# Patient Record
Sex: MALE | Race: WHITE | NOT HISPANIC OR LATINO | URBAN - METROPOLITAN AREA
[De-identification: names, ages, dates, MRNs, and addresses within clinical notes are randomized per-mention and may not be internally consistent; named-entity substitution may affect disease eponyms.]

---

## 2018-01-18 ENCOUNTER — OFFICE VISIT (OUTPATIENT)
Dept: MEDICAL GROUP | Facility: MEDICAL CENTER | Age: 63
End: 2018-01-18
Payer: COMMERCIAL

## 2018-01-18 VITALS
RESPIRATION RATE: 16 BRPM | OXYGEN SATURATION: 95 % | BODY MASS INDEX: 34.5 KG/M2 | WEIGHT: 241 LBS | DIASTOLIC BLOOD PRESSURE: 88 MMHG | HEIGHT: 70 IN | SYSTOLIC BLOOD PRESSURE: 166 MMHG | HEART RATE: 66 BPM | TEMPERATURE: 96.9 F

## 2018-01-18 DIAGNOSIS — R01.1 CARDIAC MURMUR: ICD-10-CM

## 2018-01-18 DIAGNOSIS — E66.9 OBESITY (BMI 30-39.9): ICD-10-CM

## 2018-01-18 DIAGNOSIS — E78.2 MIXED HYPERLIPIDEMIA: ICD-10-CM

## 2018-01-18 DIAGNOSIS — Z87.891 FORMER HEAVY CIGARETTE SMOKER (20-39 PER DAY): ICD-10-CM

## 2018-01-18 DIAGNOSIS — Z00.00 HEALTHCARE MAINTENANCE: ICD-10-CM

## 2018-01-18 DIAGNOSIS — Z12.11 COLON CANCER SCREENING: ICD-10-CM

## 2018-01-18 DIAGNOSIS — Z82.49 FAMILY HISTORY OF HEART ATTACK: ICD-10-CM

## 2018-01-18 DIAGNOSIS — I10 ESSENTIAL HYPERTENSION: ICD-10-CM

## 2018-01-18 PROCEDURE — 99204 OFFICE O/P NEW MOD 45 MIN: CPT | Performed by: FAMILY MEDICINE

## 2018-01-18 RX ORDER — AMLODIPINE BESYLATE 10 MG/1
10 TABLET ORAL DAILY
COMMUNITY
End: 2018-01-18 | Stop reason: SDUPTHER

## 2018-01-18 RX ORDER — LISINOPRIL AND HYDROCHLOROTHIAZIDE 20; 12.5 MG/1; MG/1
1 TABLET ORAL DAILY
Qty: 90 TAB | Refills: 1 | Status: SHIPPED | OUTPATIENT
Start: 2018-01-18 | End: 2018-10-19 | Stop reason: SDUPTHER

## 2018-01-18 RX ORDER — AMLODIPINE BESYLATE 10 MG/1
10 TABLET ORAL DAILY
Qty: 90 TAB | Refills: 1 | Status: SHIPPED | OUTPATIENT
Start: 2018-01-18 | End: 2018-03-12 | Stop reason: SDUPTHER

## 2018-01-18 ASSESSMENT — PATIENT HEALTH QUESTIONNAIRE - PHQ9: CLINICAL INTERPRETATION OF PHQ2 SCORE: 0

## 2018-01-19 NOTE — PROGRESS NOTES
CC: new patient ( HTN, HLD, obesity)    HPI:  Wally presents today to establish a new PCP.    Patient has been active, and independent. With all ADLs. He still works. Has the following chronic medical issues:    Essential hypertension  BP today is elevated. However hs been asymptomatic. Denies headache, chest pain, and SOB. Patient stated that his BP is always fine as long as he takes his medication ( always less than 130/90), but he ran out of his medication fr a week.Has been on Lisinopril - HCTZ 20-12.5 mg, and amlodipine 10 mg.    Family history of heart attack  His mom  for heart attack, with no h/o cardiac issues.    Cardiac murmur  Has been having a heart murmur for loing time ( was told about it , but he has been asymptomatic.Denies h/o rheumatic fever, or rheumatic heart disease.    Mixed hyperlipidemia  His last lipid panel in  showed high t chol ( 208), and LDL ( 146), has not been on medication.No h/o DM, CAD, or stroke.    Obesity (BMI 30-39.9)  BMI is 34. Lost about 10 lbs since .Patient is counseled about the medical rationale for weight loss in obese individuals is that obesity is associated with a significant increase in mortality, and many health risks including type 2 diabetes mellitus, hypertension, dyslipidemia, and coronary heart disease. The benefits of weight loss include a reduction in the rate of progression from impaired glucose tolerance to diabetes, blood pressure in hypertensive patients, and lipid levels in higher risk patients. Other noncardiac benefits of weight loss include reductions in urinary incontinence, sleep apnea, and depression, and improvements in quality of life, physical functioning, and mobility.Recommend lifestyle modification: exercise 30 minutes per day 5 days per week. Recommend also portion control.    Former heavy cigarette smoker   Used to smoke a pack plus a day for 40 yrs, he quit 6 yrs ago. Denies any h/o copd. Currently is  asymptomatic,    Declined the flu shot.Has never had colonoscpy before.      Patient Active Problem List    Diagnosis Date Noted   • Hypertension 01/18/2010     Priority: High   • Mixed hyperlipidemia 01/18/2018   • Obesity (BMI 30-39.9) 07/09/2014   • Former heavy cigarette smoker (20-39 per day) 07/09/2014   • Family history of heart attack 07/09/2014   • Cardiac murmur 07/09/2014       Current Outpatient Prescriptions   Medication Sig Dispense Refill   • amlodipine (NORVASC) 10 MG Tab Take 10 mg by mouth every day.     • lisinopril-hydrochlorothiazide (PRINZIDE, ZESTORETIC) 20-12.5 MG per tablet Take 1 Tab by mouth every day. 30 Tab 1     No current facility-administered medications for this visit.          Allergies as of 01/18/2018 - Reviewed 01/18/2018   Allergen Reaction Noted   • Nkda [no known drug allergy]  01/18/2010        Social History     Social History   • Marital status: Single     Spouse name: N/A   • Number of children: N/A   • Years of education: N/A     Occupational History   • Not on file.     Social History Main Topics   • Smoking status: Former Smoker     Packs/day: 1.00     Years: 42.00     Types: Cigarettes     Quit date: 3/8/2013   • Smokeless tobacco: Never Used   • Alcohol use No      Comment: quit 1 1/2 ago   • Drug use: No   • Sexual activity: Not Currently     Partners: Female      Comment: , no kids, [employer] tech     Other Topics Concern   • Not on file     Social History Narrative   • No narrative on file       Family History   Problem Relation Age of Onset   • Diabetes Father    • Hypertension Father    • Heart Attack Mother    • Cancer Neg Hx    • Stroke Neg Hx        Past Surgical History:   Procedure Laterality Date   • CERVICAL LAMINECTOMY POSTERIOR  1995       ROS:  Denies any Headache, Blurred Vision, Confusion Chest pain,  Shortness of breath,  Abdominal pain, Changes of bowel or bladder, Lower ext edema, Fevers, Nights sweats, Weight Changes, Focal weakness or  "numbness.  All other systems are negative.    BP (!) 166/88   Pulse 66   Temp 36.1 °C (96.9 °F)   Resp 16   Ht 1.778 m (5' 10\")   Wt 109.3 kg (241 lb)   SpO2 95%   BMI 34.58 kg/m²     Physical Exam:  Gen:         Alert and oriented, No apparent distress.  HEENT:   Perrla, TM clear,  Oralpharynx no erythema or exudates.  Neck:       No Jugular venous distension, Lymphadenopathy, Thyromegaly, Bruits.  Lungs:     Clear to auscultation bilaterally  CV:          Regular rate and rhythm. No murmurs, rubs or gallops.  Abd:         Soft non tender, non distended. Normal active bowel sounds. No                                        Hepatosplenomegaly, No pulsatile masses.  Ext:          No clubbing, cyanosis, edema.      Assessment and Plan.   62 y.o. male     1. Essential hypertension  Elevated. He ran out of his medication.  Continue on Lisinopril_HCTZ 20-12.5 mg, and amlodipine 10 mg.Medication refilled.  Patient advised to check his BP 2 times daily for a week, and call for any concerns.    - amlodipine (NORVASC) 10 MG Tab; Take 1 Tab by mouth every day.  Dispense: 90 Tab; Refill: 1  - lisinopril-hydrochlorothiazide (PRINZIDE, ZESTORETIC) 20-12.5 MG per tablet; Take 1 Tab by mouth every day.  Dispense: 90 Tab; Refill: 1  - CBC WITH DIFFERENTIAL; Future  - COMP METABOLIC PANEL; Future  - LIPID PANEL  - TSH; Future    2. Family history of heart attack  His mom  for heart attack, with no h/o cardiac issues.    3. Cardiac murmur  Systolic murmur. Asymptomatic.  Will continue watch.    4. Healthcare maintenance  Declined the flu shot.  Has never had colonoscopy before, order placed.    5. Mixed hyperlipidemia  Last lipid panel in  showed high t chol ( 208), and LDL ( 146), has not been on medication.  Patient is counseled about life style modification.  Will continue watch his lipid panel.    - LIPID PANEL  - TSH; Future    6. Colon cancer screening    - REFERRAL TO GI FOR COLONOSCOPY    7. Obesity (BMI " 30-39.9)  BMI is 34. Lost about 10 lbs since 2014.  Patient is counseled about life style modification.    - Patient identified as having weight management issue.  Appropriate orders and counseling given.    8. Former heavy cigarette smoker (20-39 per day)  Used to smoke a pack a day for 40 yrs, he quit 6 yrs ago. Denies any h/o copd. Currently is asymptomatic,  Will discuss lung cancer screen next visit.

## 2018-01-26 ENCOUNTER — HOSPITAL ENCOUNTER (OUTPATIENT)
Dept: LAB | Facility: MEDICAL CENTER | Age: 63
End: 2018-01-26
Attending: FAMILY MEDICINE
Payer: COMMERCIAL

## 2018-01-26 DIAGNOSIS — E78.2 MIXED HYPERLIPIDEMIA: ICD-10-CM

## 2018-01-26 DIAGNOSIS — I10 ESSENTIAL HYPERTENSION: ICD-10-CM

## 2018-01-26 LAB
ALBUMIN SERPL BCP-MCNC: 4.1 G/DL (ref 3.2–4.9)
ALBUMIN/GLOB SERPL: 1.2 G/DL
ALP SERPL-CCNC: 51 U/L (ref 30–99)
ALT SERPL-CCNC: 16 U/L (ref 2–50)
ANION GAP SERPL CALC-SCNC: 7 MMOL/L (ref 0–11.9)
AST SERPL-CCNC: 17 U/L (ref 12–45)
BASOPHILS # BLD AUTO: 1.1 % (ref 0–1.8)
BASOPHILS # BLD: 0.08 K/UL (ref 0–0.12)
BILIRUB SERPL-MCNC: 0.6 MG/DL (ref 0.1–1.5)
BUN SERPL-MCNC: 14 MG/DL (ref 8–22)
CALCIUM SERPL-MCNC: 9.7 MG/DL (ref 8.5–10.5)
CHLORIDE SERPL-SCNC: 106 MMOL/L (ref 96–112)
CHOLEST SERPL-MCNC: 203 MG/DL (ref 100–199)
CO2 SERPL-SCNC: 25 MMOL/L (ref 20–33)
CREAT SERPL-MCNC: 0.85 MG/DL (ref 0.5–1.4)
EOSINOPHIL # BLD AUTO: 0.53 K/UL (ref 0–0.51)
EOSINOPHIL NFR BLD: 7.1 % (ref 0–6.9)
ERYTHROCYTE [DISTWIDTH] IN BLOOD BY AUTOMATED COUNT: 45.2 FL (ref 35.9–50)
GLOBULIN SER CALC-MCNC: 3.4 G/DL (ref 1.9–3.5)
GLUCOSE SERPL-MCNC: 110 MG/DL (ref 65–99)
HCT VFR BLD AUTO: 44.9 % (ref 42–52)
HDLC SERPL-MCNC: 62 MG/DL
HGB BLD-MCNC: 15 G/DL (ref 14–18)
IMM GRANULOCYTES # BLD AUTO: 0.02 K/UL (ref 0–0.11)
IMM GRANULOCYTES NFR BLD AUTO: 0.3 % (ref 0–0.9)
LDLC SERPL CALC-MCNC: 132 MG/DL
LYMPHOCYTES # BLD AUTO: 1.87 K/UL (ref 1–4.8)
LYMPHOCYTES NFR BLD: 24.9 % (ref 22–41)
MCH RBC QN AUTO: 30.4 PG (ref 27–33)
MCHC RBC AUTO-ENTMCNC: 33.4 G/DL (ref 33.7–35.3)
MCV RBC AUTO: 90.9 FL (ref 81.4–97.8)
MONOCYTES # BLD AUTO: 0.69 K/UL (ref 0–0.85)
MONOCYTES NFR BLD AUTO: 9.2 % (ref 0–13.4)
NEUTROPHILS # BLD AUTO: 4.31 K/UL (ref 1.82–7.42)
NEUTROPHILS NFR BLD: 57.4 % (ref 44–72)
NRBC # BLD AUTO: 0 K/UL
NRBC BLD-RTO: 0 /100 WBC
PLATELET # BLD AUTO: 387 K/UL (ref 164–446)
PMV BLD AUTO: 9.5 FL (ref 9–12.9)
POTASSIUM SERPL-SCNC: 4.1 MMOL/L (ref 3.6–5.5)
PROT SERPL-MCNC: 7.5 G/DL (ref 6–8.2)
RBC # BLD AUTO: 4.94 M/UL (ref 4.7–6.1)
SODIUM SERPL-SCNC: 138 MMOL/L (ref 135–145)
TRIGL SERPL-MCNC: 46 MG/DL (ref 0–149)
TSH SERPL DL<=0.005 MIU/L-ACNC: 0.42 UIU/ML (ref 0.38–5.33)
WBC # BLD AUTO: 7.5 K/UL (ref 4.8–10.8)

## 2018-01-26 PROCEDURE — 80053 COMPREHEN METABOLIC PANEL: CPT

## 2018-01-26 PROCEDURE — 84443 ASSAY THYROID STIM HORMONE: CPT

## 2018-01-26 PROCEDURE — 85025 COMPLETE CBC W/AUTO DIFF WBC: CPT

## 2018-01-26 PROCEDURE — 36415 COLL VENOUS BLD VENIPUNCTURE: CPT

## 2018-01-26 PROCEDURE — 80061 LIPID PANEL: CPT

## 2018-02-09 ENCOUNTER — NON-PROVIDER VISIT (OUTPATIENT)
Dept: MEDICAL GROUP | Facility: MEDICAL CENTER | Age: 63
End: 2018-02-09
Payer: COMMERCIAL

## 2018-02-09 DIAGNOSIS — Z00.00 HEALTH CARE MAINTENANCE: ICD-10-CM

## 2018-02-09 PROCEDURE — 90736 HZV VACCINE LIVE SUBQ: CPT | Performed by: FAMILY MEDICINE

## 2018-02-09 PROCEDURE — 90471 IMMUNIZATION ADMIN: CPT | Performed by: FAMILY MEDICINE

## 2018-02-09 PROCEDURE — 90715 TDAP VACCINE 7 YRS/> IM: CPT | Performed by: FAMILY MEDICINE

## 2018-02-09 PROCEDURE — 90472 IMMUNIZATION ADMIN EACH ADD: CPT | Performed by: FAMILY MEDICINE

## 2018-02-09 NOTE — PROGRESS NOTES
"Wally Horton is a 62 y.o. male here for a non-provider visit for:   DTaP  1 of 1     Reason for immunization: continue or complete series started at the office  Immunization records indicate need for vaccine: Yes, confirmed with Epic  Minimum interval has been met for this vaccine: Yes  ABN completed: Yes    Order and dose verified by: mv  VIS Dated  02/24/2015 was given to patient: Yes  All IAC Questionnaire questions were answered \"No.\"    Patient tolerated injection and no adverse effects were observed or reported: Yes    Pt scheduled for next dose in series: Not Indicated    "

## 2018-02-16 ENCOUNTER — OFFICE VISIT (OUTPATIENT)
Dept: MEDICAL GROUP | Facility: MEDICAL CENTER | Age: 63
End: 2018-02-16
Payer: COMMERCIAL

## 2018-02-16 VITALS
WEIGHT: 229 LBS | TEMPERATURE: 97.9 F | HEIGHT: 70 IN | HEART RATE: 70 BPM | DIASTOLIC BLOOD PRESSURE: 66 MMHG | OXYGEN SATURATION: 95 % | SYSTOLIC BLOOD PRESSURE: 124 MMHG | BODY MASS INDEX: 32.78 KG/M2 | RESPIRATION RATE: 16 BRPM

## 2018-02-16 DIAGNOSIS — Z00.00 HEALTHCARE MAINTENANCE: ICD-10-CM

## 2018-02-16 DIAGNOSIS — E78.2 MIXED HYPERLIPIDEMIA: ICD-10-CM

## 2018-02-16 DIAGNOSIS — I10 ESSENTIAL HYPERTENSION: ICD-10-CM

## 2018-02-16 DIAGNOSIS — R73.02 IGT (IMPAIRED GLUCOSE TOLERANCE): ICD-10-CM

## 2018-02-16 PROCEDURE — 99214 OFFICE O/P EST MOD 30 MIN: CPT | Performed by: FAMILY MEDICINE

## 2018-02-16 NOTE — PROGRESS NOTES
"CC: HTN/ HLD/High blood glucose.    HPI:   Wally presents today to discuss the lab results and the following medical issues:    Essential hypertension  BP has been adequately controlled on current medication. Denies headache, chest pain, and SOB.Has been on Amlodipine 10 mg and lisinopril-HCTZ 10-12.5 mg daily.No side effects.    Mixed hyperlipidemia  His last lipid panel showed improvement in both T chol , and LDL.( T chol was 207, nw is 203, LLDL was 146, now is 132).has been on diet control.    IGT (impaired glucose tolerance)  His most recent fasting BS was 110,has been asymptomatic. No polyuria, or polydipsia. Has been on diet control.    Declined the flu shot, he is scheduled for colonoscopy next visit.        Patient Active Problem List    Diagnosis Date Noted   • Hypertension 01/18/2010     Priority: High   • Mixed hyperlipidemia 01/18/2018   • Obesity (BMI 30-39.9) 07/09/2014   • Former heavy cigarette smoker (20-39 per day) 07/09/2014   • Family history of heart attack 07/09/2014   • Cardiac murmur 07/09/2014       Current Outpatient Prescriptions   Medication Sig Dispense Refill   • amlodipine (NORVASC) 10 MG Tab Take 1 Tab by mouth every day. 90 Tab 1   • lisinopril-hydrochlorothiazide (PRINZIDE, ZESTORETIC) 20-12.5 MG per tablet Take 1 Tab by mouth every day. 90 Tab 1     No current facility-administered medications for this visit.          Allergies as of 02/16/2018 - Reviewed 02/16/2018   Allergen Reaction Noted   • Nkda [no known drug allergy]  01/18/2010        ROS: Denies any chest pain, Shortness of breath, Changes bowel or bladder, Lower extremity edema.    Physical Exam:  /66   Pulse 70   Temp 36.6 °C (97.9 °F)   Resp 16   Ht 1.778 m (5' 10\")   Wt 103.9 kg (229 lb)   SpO2 95%   BMI 32.86 kg/m²   Gen.: Well-developed, well-nourished, no apparent distress,pleasant and cooperative with the examination  Skin:  Warm and dry with good turgor. No rashes or suspicious lesions in visible " areas  HEENT:Sinuses nontender with palpation, TMs clear, nares patent with pink mucosa and clear rhinorrhea,no septal deviation ,polyps or lesions. lips without lesions, oropharynx clear.  Neck: Trachea midline,no masses or adenopathy. No JVD.  Cor: Regular rate and rhythm without murmur, gallop or rub.  Lungs: Respirations unlabored.Clear to auscultation with equal breath sounds bilaterally. No wheezes, rhonchi.  Extremities: No cyanosis, clubbing or edema.      Assessment and Plan.   62 y.o. male     1. Essential hypertension  Has been adequately controlled on current medication. Denies headache, chest pain, and SOB.  Continue on Amlodipine 10 mg and lisinopril-HCTZ 10-12.5 mg daily.    2. Mixed hyperlipidemia  Last lipid panel showed improvement in both T chol , and LDL.( T chol was 207, nw is 203, LLDL was 146, now is 132).  Continue on diet control.    3. IGT (impaired glucose tolerance)  Most recent fasting BS was 110, patient ius counseled about life style modification.    4. Healthcare maintenance  Declined the flu shot.  Scheduled for colonoscopy next visit.

## 2018-03-12 DIAGNOSIS — I10 ESSENTIAL HYPERTENSION: ICD-10-CM

## 2018-03-12 RX ORDER — AMLODIPINE BESYLATE 10 MG/1
10 TABLET ORAL DAILY
Qty: 90 TAB | Refills: 3 | Status: SHIPPED | OUTPATIENT
Start: 2018-03-12 | End: 2018-10-19 | Stop reason: SDUPTHER

## 2018-03-12 NOTE — TELEPHONE ENCOUNTER
Was the patient seen in the last year in this department? Yes     Does patient have an active prescription for medications requested? Yes     Received Request Via: Pharmacy     Changing to mail order

## 2018-04-20 ENCOUNTER — OFFICE VISIT (OUTPATIENT)
Dept: MEDICAL GROUP | Facility: MEDICAL CENTER | Age: 63
End: 2018-04-20
Payer: COMMERCIAL

## 2018-04-20 VITALS
BODY MASS INDEX: 32.6 KG/M2 | OXYGEN SATURATION: 95 % | HEIGHT: 70 IN | SYSTOLIC BLOOD PRESSURE: 124 MMHG | TEMPERATURE: 98.2 F | HEART RATE: 64 BPM | DIASTOLIC BLOOD PRESSURE: 60 MMHG | RESPIRATION RATE: 16 BRPM | WEIGHT: 227.74 LBS

## 2018-04-20 DIAGNOSIS — I10 ESSENTIAL HYPERTENSION: ICD-10-CM

## 2018-04-20 DIAGNOSIS — Z86.010 HX OF ADENOMATOUS COLONIC POLYPS: ICD-10-CM

## 2018-04-20 DIAGNOSIS — Z87.891 FORMER HEAVY CIGARETTE SMOKER (20-39 PER DAY): ICD-10-CM

## 2018-04-20 DIAGNOSIS — E78.2 MIXED HYPERLIPIDEMIA: ICD-10-CM

## 2018-04-20 DIAGNOSIS — F17.211 CIGARETTE NICOTINE DEPENDENCE IN REMISSION: ICD-10-CM

## 2018-04-20 PROCEDURE — 99214 OFFICE O/P EST MOD 30 MIN: CPT | Performed by: FAMILY MEDICINE

## 2018-04-20 NOTE — PROGRESS NOTES
"CC:HTN, HLD, colonic polyps    HPI:   Wally presents today to discuss the following:    Essential hypertension  BP has been adequately controlled on current medication. Denies headache, chest pain, and SOB.Has been on Amlodipine 100 mg , lisinopril-HCTZ 20-12.5 mg daily, no side effects.    Mixed hyperlipidemia  His last lipid panel showed LDL was 132.No h/o DM, CAD, or stroke.Has been on diet control    He is former heavy cigarette smoker (20-39 per day)  Quit 7 yrs ago, referred to lung screening program.    Hx of adenomatous colonic polyps  Underwent a recent colonoscopy, was found to have multiple adenomatous polyps, were removed.Has been asymptomatic. Schedulled to repeat colonoscopy in 3 yrs    Patient Active Problem List    Diagnosis Date Noted   • Hypertension 01/18/2010     Priority: High   • Mixed hyperlipidemia 01/18/2018   • Obesity (BMI 30-39.9) 07/09/2014   • Former heavy cigarette smoker (20-39 per day) 07/09/2014   • Family history of heart attack 07/09/2014   • Cardiac murmur 07/09/2014       Current Outpatient Prescriptions   Medication Sig Dispense Refill   • amLODIPine (NORVASC) 10 MG Tab Take 1 Tab by mouth every day. 90 Tab 3   • lisinopril-hydrochlorothiazide (PRINZIDE, ZESTORETIC) 20-12.5 MG per tablet Take 1 Tab by mouth every day. 90 Tab 1     No current facility-administered medications for this visit.          Allergies as of 04/20/2018 - Reviewed 04/20/2018   Allergen Reaction Noted   • Nkda [no known drug allergy]  01/18/2010        ROS: Denies any chest pain, Shortness of breath, Changes bowel or bladder, Lower extremity edema.    Physical Exam:  /60   Pulse 64   Temp 36.8 °C (98.2 °F)   Resp 16   Ht 1.778 m (5' 10\")   Wt 103.3 kg (227 lb 11.8 oz)   SpO2 95%   BMI 32.68 kg/m²   Gen.: Well-developed, well-nourished, no apparent distress,pleasant and cooperative with the examination  Skin:  Warm and dry with good turgor. No rashes or suspicious lesions in visible " areas  HEENT:Sinuses nontender with palpation, TMs clear, nares patent with pink mucosa and clear rhinorrhea,no septal deviation ,polyps or lesions. lips without lesions, oropharynx clear.  Neck: Trachea midline,no masses or adenopathy. No JVD.  Cor: Regular rate and rhythm without murmur, gallop or rub.  Lungs: Respirations unlabored.Clear to auscultation with equal breath sounds bilaterally. No wheezes, rhonchi.  Extremities: No cyanosis, clubbing or edema.  Abd: Soft, NT, ND, BS+    Assessment and Plan.   62 y.o. male     1. Essential hypertension  Has been adequately controlled on current medication. Denies headache, chest pain, and SOB.  Continue on Amlodipine, lisinopril-HCTZ.    2. Mixed hyperlipidemia  Last lipid panel showed LDL was 132.  Patient is counseled about life style modification. Will recheck lipid panel in 3 month.    - REFERRAL TO LUNG CANCER SCREENING PROGRAM  - LIPID PANEL    3. Former heavy cigarette smoker (20-39 per day)/ Cigarette nicotine dependence in remission    - REFERRAL TO LUNG CANCER SCREENING PROGRAM    5. Hx of adenomatous colonic polyps  Asymptomatic.S/P colonoscopy has had multiple adenomatous polyps.  Schedulled to repeat colonoscopy in 3 yrs

## 2018-04-24 ENCOUNTER — TELEPHONE (OUTPATIENT)
Dept: HEMATOLOGY ONCOLOGY | Facility: MEDICAL CENTER | Age: 63
End: 2018-04-24

## 2018-04-24 NOTE — TELEPHONE ENCOUNTER
Received referral to lung cancer screening program.  Chart review to assess for lung cancer screening program eligibility.   1. Age 55-77 yrs of age? Yes 62 y.o.  2. 30 pack year hx of smoking, or greater? Yes 1 [id number]= 42 pkyr hx  3. Current smoker or if quit, has pt quit within last 15 yrs?Yes Quit 3/2013   4. Any signs or symptoms of lung cancer? None noted  5. Previous history of lung cancer? None noted  6. Chest CT within past 12 mos.? None noted  Patient does meet eligibility criteria. [facility] scheduling notified to schedule the shared decision making visit.

## 2018-10-19 ENCOUNTER — OFFICE VISIT (OUTPATIENT)
Dept: MEDICAL GROUP | Facility: MEDICAL CENTER | Age: 63
End: 2018-10-19
Payer: COMMERCIAL

## 2018-10-19 VITALS
RESPIRATION RATE: 16 BRPM | OXYGEN SATURATION: 89 % | BODY MASS INDEX: 34.93 KG/M2 | HEART RATE: 79 BPM | HEIGHT: 70 IN | TEMPERATURE: 98.2 F | WEIGHT: 244 LBS | DIASTOLIC BLOOD PRESSURE: 72 MMHG | SYSTOLIC BLOOD PRESSURE: 146 MMHG

## 2018-10-19 DIAGNOSIS — I10 ESSENTIAL HYPERTENSION: ICD-10-CM

## 2018-10-19 DIAGNOSIS — Z87.891 FORMER HEAVY CIGARETTE SMOKER (20-39 PER DAY): ICD-10-CM

## 2018-10-19 DIAGNOSIS — R06.02 SOB (SHORTNESS OF BREATH): ICD-10-CM

## 2018-10-19 PROCEDURE — 99214 OFFICE O/P EST MOD 30 MIN: CPT | Performed by: FAMILY MEDICINE

## 2018-10-19 RX ORDER — LISINOPRIL AND HYDROCHLOROTHIAZIDE 20; 12.5 MG/1; MG/1
1 TABLET ORAL DAILY
Qty: 90 TAB | Refills: 3 | Status: SHIPPED | OUTPATIENT
Start: 2018-10-19 | End: 2019-11-29 | Stop reason: SDUPTHER

## 2018-10-19 RX ORDER — DOXYCYCLINE HYCLATE 100 MG
100 TABLET ORAL 2 TIMES DAILY
Qty: 14 TAB | Refills: 0 | Status: SHIPPED | OUTPATIENT
Start: 2018-10-19 | End: 2018-10-26

## 2018-10-19 RX ORDER — METHYLPREDNISOLONE 4 MG/1
TABLET ORAL
Qty: 21 TAB | Refills: 0 | Status: SHIPPED | OUTPATIENT
Start: 2018-10-19

## 2018-10-19 RX ORDER — IPRATROPIUM BROMIDE AND ALBUTEROL SULFATE 2.5; .5 MG/3ML; MG/3ML
3 SOLUTION RESPIRATORY (INHALATION) ONCE
OUTPATIENT
Start: 2018-10-19 | End: 2018-10-20

## 2018-10-19 RX ORDER — ALBUTEROL SULFATE 90 UG/1
2 AEROSOL, METERED RESPIRATORY (INHALATION) EVERY 6 HOURS PRN
Qty: 8.5 G | Refills: 3 | Status: SHIPPED | OUTPATIENT
Start: 2018-10-19 | End: 2019-03-18 | Stop reason: SDUPTHER

## 2018-10-19 RX ORDER — AMLODIPINE BESYLATE 10 MG/1
10 TABLET ORAL DAILY
Qty: 90 TAB | Refills: 3 | Status: SHIPPED | OUTPATIENT
Start: 2018-10-19 | End: 2019-10-14 | Stop reason: SDUPTHER
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
## 2018-10-24 ENCOUNTER — NON-PROVIDER VISIT (OUTPATIENT)
Dept: MEDICAL GROUP | Facility: MEDICAL CENTER | Age: 63
End: 2018-10-24
Payer: COMMERCIAL

## 2018-10-24 DIAGNOSIS — Z23 FLU VACCINE NEED: ICD-10-CM

## 2018-10-24 PROCEDURE — 90471 IMMUNIZATION ADMIN: CPT | Performed by: FAMILY MEDICINE

## 2018-10-24 PROCEDURE — 90686 IIV4 VACC NO PRSV 0.5 ML IM: CPT | Performed by: FAMILY MEDICINE

## 2018-10-24 NOTE — NON-PROVIDER
"Wally Horton is a 63 y.o. male here for a non-provider visit for:   FLU    Reason for immunization: Annual Flu Vaccine  Immunization records indicate need for vaccine: Yes, confirmed with Epic  Minimum interval has been met for this vaccine: Yes  ABN completed: No    Order and dose verified by: smiley  VIS Dated  08/07/2015 was given to patient: Yes  All IAC Questionnaire questions were answered \"No.\"    Patient tolerated injection and no adverse effects were observed or reported: Yes    Pt scheduled for next dose in series: No  "

## 2018-11-13 ENCOUNTER — OFFICE VISIT (OUTPATIENT)
Dept: MEDICAL GROUP | Facility: MEDICAL CENTER | Age: 63
End: 2018-11-13
Payer: COMMERCIAL

## 2018-11-13 VITALS
WEIGHT: 249.34 LBS | OXYGEN SATURATION: 94 % | RESPIRATION RATE: 16 BRPM | HEART RATE: 91 BPM | SYSTOLIC BLOOD PRESSURE: 118 MMHG | DIASTOLIC BLOOD PRESSURE: 70 MMHG | BODY MASS INDEX: 35.7 KG/M2 | HEIGHT: 70 IN | TEMPERATURE: 97.7 F

## 2018-11-13 DIAGNOSIS — J44.9 CHRONIC OBSTRUCTIVE PULMONARY DISEASE, UNSPECIFIED COPD TYPE (HCC): ICD-10-CM

## 2018-11-13 PROCEDURE — 99213 OFFICE O/P EST LOW 20 MIN: CPT | Performed by: FAMILY MEDICINE

## 2018-11-13 RX ORDER — FLUTICASONE PROPIONATE 110 UG/1
2 AEROSOL, METERED RESPIRATORY (INHALATION) 2 TIMES DAILY
Qty: 1 INHALER | Refills: 3 | Status: SHIPPED | OUTPATIENT
Start: 2018-11-13 | End: 2019-02-28 | Stop reason: SDUPTHER

## 2018-11-14 NOTE — PROGRESS NOTES
CC: COPD    HPI:   Wally presents today to follow-up after he was treated for acute COPD exacerbation.    Patient has finished oral antibiotics, oral steroids, and has been taking albuterol rest frequent, 1-2 times a week.  Patient used to smoke about 20-40 cigarettes a day for 40 years and he quit 7 years ago.  However his wife still smokes.  Currently patient denies any cough, or shortness of breath.  Once in a while he gets short of breath early in the morning or when he inhales any kind of dust or smoke.  He has no pulmonary function test in record.        Patient Active Problem List    Diagnosis Date Noted   • Hypertension 01/18/2010     Priority: High   • Mixed hyperlipidemia 01/18/2018   • Obesity (BMI 30-39.9) 07/09/2014   • Former heavy cigarette smoker (20-39 per day) 07/09/2014   • Family history of heart attack 07/09/2014   • Cardiac murmur 07/09/2014       Current Outpatient Prescriptions   Medication Sig Dispense Refill   • fluticasone (FLOVENT HFA) 110 MCG/ACT Aerosol Inhale 2 Puffs by mouth 2 times a day. 1 Inhaler 3   • albuterol 108 (90 Base) MCG/ACT Aero Soln inhalation aerosol Inhale 2 Puffs by mouth every 6 hours as needed for Shortness of Breath. 8.5 g 3   • amLODIPine (NORVASC) 10 MG Tab Take 1 Tab by mouth every day. 90 Tab 3   • lisinopril-hydrochlorothiazide (PRINZIDE, ZESTORETIC) 20-12.5 MG per tablet Take 1 Tab by mouth every day. 90 Tab 3   • MethylPREDNISolone (MEDROL DOSEPAK) 4 MG Tablet Therapy Pack As directed on the packaging label. (Patient not taking: Reported on 11/13/2018) 21 Tab 0     No current facility-administered medications for this visit.          Allergies as of 11/13/2018 - Reviewed 11/13/2018   Allergen Reaction Noted   • Nkda [no known drug allergy]  01/18/2010        ROS: Denies any chest pain, Shortness of breath, Changes bowel or bladder, Lower extremity edema.    Physical Exam:  /70 (BP Location: Right arm, Patient Position: Sitting, BP Cuff Size: Adult)    "Pulse 91   Temp 36.5 °C (97.7 °F)   Resp 16   Ht 1.778 m (5' 10\")   Wt 113.1 kg (249 lb 5.4 oz)   SpO2 94%   BMI 35.78 kg/m²   Gen.: Well-developed, well-nourished, no apparent distress,pleasant and cooperative with the examination  Cor: Regular rate and rhythm without murmur, gallop or rub.  Lungs: Respirations unlabored.Clear to auscultation with equal breath sounds bilaterally. No wheezes, rhonchi.        Assessment and Plan.   63 y.o. male     1. Chronic obstructive pulmonary disease, unspecified COPD type (HCC)  Acute exacerbation has improved.  Continue albuterol as needed.  We will add Flovent inhaler to be taken twice a day.  Will send patient for pulmonary function test.  Advised to avoid irritations like cigarette smoking(patient was a smoker used to smoke 20-40 cigarettes a day for many years and he quit 7 years ago, however his wife smokes).    - PULMONARY FUNCTION TESTS -Test requested: Complete Pulmonary Function Test; Future  - fluticasone (FLOVENT HFA) 110 MCG/ACT Aerosol; Inhale 2 Puffs by mouth 2 times a day.  Dispense: 1 Inhaler; Refill: 3    Please note that this dictation was created using voice recognition software. I have made every reasonable attempt to correct obvious errors but there may be errors of grammar and content that I may have overlooked prior to finalization of this note.     "

## 2018-12-06 ENCOUNTER — HOSPITAL ENCOUNTER (OUTPATIENT)
Dept: PULMONOLOGY | Facility: MEDICAL CENTER | Age: 63
End: 2018-12-06
Attending: FAMILY MEDICINE
Payer: COMMERCIAL

## 2018-12-06 PROCEDURE — 94729 DIFFUSING CAPACITY: CPT

## 2018-12-06 PROCEDURE — 94060 EVALUATION OF WHEEZING: CPT | Mod: 26 | Performed by: INTERNAL MEDICINE

## 2018-12-06 PROCEDURE — 94060 EVALUATION OF WHEEZING: CPT

## 2018-12-06 PROCEDURE — 94726 PLETHYSMOGRAPHY LUNG VOLUMES: CPT | Mod: 26 | Performed by: INTERNAL MEDICINE

## 2018-12-06 PROCEDURE — 94726 PLETHYSMOGRAPHY LUNG VOLUMES: CPT

## 2018-12-06 PROCEDURE — 94729 DIFFUSING CAPACITY: CPT | Mod: 26 | Performed by: INTERNAL MEDICINE

## 2018-12-06 ASSESSMENT — PULMONARY FUNCTION TESTS
FEV1/FVC_PERCENT_PREDICTED: 117
FVC_PERCENT_PREDICTED: 52
FEV1_PERCENT_CHANGE: 2
FEV1/FVC_PERCENT_PREDICTED: 117
FVC: 2.5
FEV1/FVC_PERCENT_PREDICTED: 77
FEV1/FVC: 89
FEV1_PERCENT_PREDICTED: 63
FEV1/FVC_PERCENT_PREDICTED: 116
FVC_LLN: 3.83
FEV1_PREDICTED: 3.54
FEV1_LLN: 2.96
FEV1: 2.18
FVC: 2.42
FVC_LLN: 3.83
FEV1_PERCENT_PREDICTED: 61
FEV1/FVC: 90
FEV1/FVC_PERCENT_LLN: 64
FEV1/FVC_PERCENT_CHANGE: 0
FEV1/FVC: 90
FVC_PREDICTED: 4.59
FEV1/FVC_PERCENT_CHANGE: 67
FVC_PERCENT_PREDICTED: 54
FEV1_PERCENT_CHANGE: 3
FEV1_LLN: 2.96
FEV1/FVC: 89.2
FEV1/FVC_PREDICTED: 77
FEV1: 2.23
FEV1/FVC_PERCENT_LLN: 64
FEV1/FVC_PERCENT_PREDICTED: 116

## 2018-12-10 NOTE — PROCEDURES
PULMONARY FUNCTION TEST INTERPRETATION    REQUESTING PROVIDER:  Jaqueline Millan. MD    REASON FOR REQUEST:  COPD.    FINDINGS:  1.  Acceptable and reproducible to FEV1 2.18 liters (61%), FVC of 2.42 liters   (52%), ratio 90%.  2.  Flow volume loops consistent with restrictions.  3.  TLC 4.39 liters (62%).  4.  DLCO 23.25 mL/min/mmHg (92%).    IMPRESSION:  Spirometry consistent with restriction with markedly reduced FVC   of 52%.  No response to bronchodilator.  Restriction is moderate with total   lung capacity at 62%, and normal gas transfer.  When there is normal gas   transfer and severe restriction, may be extrathoracic cause of patient's   restrictions.  Clinically correlate.       ____________________________________     MD JASPREET Edwards / DEMETRIUS    DD:  12/09/2018 20:21:47  DT:  12/09/2018 20:38:37    D#:  [id number]  Job#:  [id number]    cc: Jaqueline Millan MD

## 2018-12-28 ENCOUNTER — PATIENT MESSAGE (OUTPATIENT)
Dept: MEDICAL GROUP | Facility: MEDICAL CENTER | Age: 63
End: 2018-12-28

## 2018-12-31 NOTE — TELEPHONE ENCOUNTER
From: Wally Horton  To: Jaqueline Millan M.D.  Sent: 12/28/2018 6:12 PM PST  Subject: Test Result Question    I can't get the test results to open up or they just don't have it in myChart file and also do I need to come in to talk to the Doctor Yana about the results and I need to schedule a appointment 90 days from the last appointment I had Thank you Wally Horton

## 2019-02-28 ENCOUNTER — OFFICE VISIT (OUTPATIENT)
Dept: MEDICAL GROUP | Facility: MEDICAL CENTER | Age: 64
End: 2019-02-28
Payer: COMMERCIAL

## 2019-02-28 VITALS
OXYGEN SATURATION: 100 % | WEIGHT: 250 LBS | RESPIRATION RATE: 16 BRPM | HEIGHT: 70 IN | SYSTOLIC BLOOD PRESSURE: 148 MMHG | HEART RATE: 82 BPM | TEMPERATURE: 98.5 F | BODY MASS INDEX: 35.79 KG/M2 | DIASTOLIC BLOOD PRESSURE: 80 MMHG

## 2019-02-28 DIAGNOSIS — J44.1 COPD EXACERBATION (HCC): ICD-10-CM

## 2019-02-28 DIAGNOSIS — I10 ESSENTIAL HYPERTENSION: ICD-10-CM

## 2019-02-28 DIAGNOSIS — J44.9 CHRONIC OBSTRUCTIVE PULMONARY DISEASE, UNSPECIFIED COPD TYPE (HCC): ICD-10-CM

## 2019-02-28 PROCEDURE — 99214 OFFICE O/P EST MOD 30 MIN: CPT | Performed by: FAMILY MEDICINE

## 2019-02-28 RX ORDER — IPRATROPIUM BROMIDE AND ALBUTEROL SULFATE 2.5; .5 MG/3ML; MG/3ML
3 SOLUTION RESPIRATORY (INHALATION) ONCE
Status: COMPLETED | OUTPATIENT
Start: 2019-02-28 | End: 2019-02-28

## 2019-02-28 RX ORDER — FLUTICASONE PROPIONATE 110 UG/1
2 AEROSOL, METERED RESPIRATORY (INHALATION) 2 TIMES DAILY
Qty: 1 INHALER | Refills: 3 | Status: SHIPPED | OUTPATIENT
Start: 2019-02-28 | End: 2019-03-13 | Stop reason: SDUPTHER

## 2019-02-28 RX ORDER — FLUTICASONE PROPIONATE 110 UG/1
2 AEROSOL, METERED RESPIRATORY (INHALATION) 2 TIMES DAILY
Qty: 1 INHALER | Refills: 3 | Status: SHIPPED | OUTPATIENT
Start: 2019-02-28 | End: 2019-03-11 | Stop reason: SDUPTHER

## 2019-02-28 RX ORDER — METHYLPREDNISOLONE 4 MG/1
TABLET ORAL
Qty: 21 TAB | Refills: 0 | Status: SHIPPED | OUTPATIENT
Start: 2019-02-28 | End: 2019-08-02 | Stop reason: SDUPTHER

## 2019-02-28 RX ADMIN — IPRATROPIUM BROMIDE AND ALBUTEROL SULFATE 3 ML: 2.5; .5 SOLUTION RESPIRATORY (INHALATION) at 17:58

## 2019-03-01 NOTE — PROGRESS NOTES
CC: Cough and shortness of breath, hypertension    HPI:   Wally presents today to discuss the following medical issues:    COPD exacerbation (HCC)  Patient stated that has been feeling that his chest is tight for the past week however it has worsened gradually on for the past 3 days has been having dry cough, shortness of breath, Associated with clear phlegm.  Denies any chest pain, fever, or chills.  Patient stated that he has been taking Ventolin inhaler as needed, but since he ran out of the Flovent 2 weeks ago has been taking it more frequent about once or twice a day.  Patient has had pulmonary function test showed restrictive lung disease.  But with the symptoms patient has, chronic recurrent wheeze, history of heavy smoking, patient is is clinically COPD.  Consider referral to pulmonologist.    Essential hypertension  Blood pressure today is slightly high for his age.  Denies headache, chest pain, shortness of breath.  Patient stated that she has been on lisinopril-hydrochlorothiazide 20-12.5 mg daily, and amlodipine 10 mg daily, has been taking his medication in a regular basis and his blood pressure is always within normal limit less than (140/90.)    Patient Active Problem List    Diagnosis Date Noted   • Hypertension 01/18/2010     Priority: High   • Chronic obstructive pulmonary disease (HCC) 02/28/2019   • Mixed hyperlipidemia 01/18/2018   • Obesity (BMI 30-39.9) 07/09/2014   • Former heavy cigarette smoker (20-39 per day) 07/09/2014   • Family history of heart attack 07/09/2014   • Cardiac murmur 07/09/2014       Current Outpatient Prescriptions   Medication Sig Dispense Refill   • MethylPREDNISolone (MEDROL DOSEPAK) 4 MG Tablet Therapy Pack As directed on the packaging label. 21 Tab 0   • fluticasone (FLOVENT HFA) 110 MCG/ACT Aerosol Inhale 2 Puffs by mouth 2 times a day. 1 Inhaler 3   • fluticasone (FLOVENT HFA) 110 MCG/ACT Aerosol Inhale 2 Puffs by mouth 2 times a day. 1 Inhaler 3   • albuterol 108  "(90 Base) MCG/ACT Aero Soln inhalation aerosol Inhale 2 Puffs by mouth every 6 hours as needed for Shortness of Breath. 8.5 g 3   • MethylPREDNISolone (MEDROL DOSEPAK) 4 MG Tablet Therapy Pack As directed on the packaging label. (Patient not taking: Reported on 11/13/2018) 21 Tab 0   • amLODIPine (NORVASC) 10 MG Tab Take 1 Tab by mouth every day. 90 Tab 3   • lisinopril-hydrochlorothiazide (PRINZIDE, ZESTORETIC) 20-12.5 MG per tablet Take 1 Tab by mouth every day. 90 Tab 3     Current Facility-Administered Medications   Medication Dose Route Frequency Provider Last Rate Last Dose   • ipratropium-albuterol (DUONEB) nebulizer solution  3 mL Nebulization Once Jaqueline Millan M.D.             Allergies as of 02/28/2019 - Reviewed 02/28/2019   Allergen Reaction Noted   • Nkda [no known drug allergy]  01/18/2010        ROS: Denies any chest pain, Shortness of breath, Changes bowel or bladder, Lower extremity edema.    Physical Exam:  /80 (BP Location: Right arm, Patient Position: Sitting, BP Cuff Size: Adult long)   Pulse 82   Temp 36.9 °C (98.5 °F) (Temporal)   Resp 16   Ht 1.778 m (5' 10\")   Wt 113.4 kg (250 lb)   SpO2 100%   BMI 35.87 kg/m²   Gen.: Well-developed, well-nourished, no apparent distress,pleasant and cooperative with the examination  Skin:  Warm and dry with good turgor. No rashes or suspicious lesions in visible areas  HEENT:Sinuses nontender with palpation, TMs clear, nares patent with pink mucosa and clear rhinorrhea,no septal deviation ,polyps or lesions. lips without lesions, oropharynx clear.  Neck: Trachea midline,no masses or adenopathy. No JVD.  Cor: Regular rate and rhythm without murmur, gallop or rub.  Lungs: Respirations unlabored.decrease breath sounds bilaterally.  Diffuse expiratory wheezes, no crackles  Extremities: No cyanosis, clubbing or edema.      Assessment and Plan.   63 y.o. male     1. COPD exacerbation (HCC)  Duoneb nebulizer treatment given in the clinic " today.  Will start patient on oral steroids.  Advised to take albuterol inhaler every 6 hours in a regular basis for 3 days, then every 6 hours as needed after that.  We will refill Flovent inhaler, patient advised to call for a refill about 1 or 2 a week before he ra=un out of the medication.  Patient has been having clear phlegm so, probably no need for antibiotics.  Patient advised to return to the clinic if the condition gets worse.  Consider adding Spiriva in the future.  Patient has had pulmonary function test showed restrictive lung disease.  But with the symptoms patient has, chronic recurrent wheeze, history of heavy smoking, patient is is clinically COPD.  Will refer patient to pulmonologist for more evaluation.    - MethylPREDNISolone (MEDROL DOSEPAK) 4 MG Tablet Therapy Pack; As directed on the packaging label.  Dispense: 21 Tab; Refill: 0  - ipratropium-albuterol (DUONEB) nebulizer solution; 3 mL by Nebulization route Once.  - fluticasone (FLOVENT HFA) 110 MCG/ACT Aerosol; Inhale 2 Puffs by mouth 2 times a day.  Dispense: 1 Inhaler; Refill: 3    2. Essential hypertension  Slightly elevated for his age.  Probably because of the respiratory distress.  Will now continue on current medication(lisinopril-hydrochlorothiazide 20-12.5 mg daily, and amlodipine 10 mg daily)

## 2019-03-11 DIAGNOSIS — J44.9 CHRONIC OBSTRUCTIVE PULMONARY DISEASE, UNSPECIFIED COPD TYPE (HCC): ICD-10-CM

## 2019-03-11 RX ORDER — FLUTICASONE PROPIONATE 110 UG/1
2 AEROSOL, METERED RESPIRATORY (INHALATION) 2 TIMES DAILY
Qty: 1 INHALER | Refills: 3 | Status: SHIPPED | OUTPATIENT
Start: 2019-03-11

## 2019-03-13 DIAGNOSIS — J44.1 COPD EXACERBATION (HCC): ICD-10-CM

## 2019-03-13 RX ORDER — FLUTICASONE PROPIONATE 110 UG/1
2 AEROSOL, METERED RESPIRATORY (INHALATION) 2 TIMES DAILY
Qty: 1 INHALER | Refills: 3 | Status: SHIPPED | OUTPATIENT
Start: 2019-03-13 | End: 2020-04-27 | Stop reason: SDUPTHER

## 2019-03-18 DIAGNOSIS — R06.02 SOB (SHORTNESS OF BREATH): ICD-10-CM

## 2019-03-18 RX ORDER — ALBUTEROL SULFATE 90 UG/1
2 AEROSOL, METERED RESPIRATORY (INHALATION) EVERY 6 HOURS PRN
Qty: 8.5 G | Refills: 3 | Status: SHIPPED | OUTPATIENT
Start: 2019-03-18 | End: 2020-04-27 | Stop reason: SDUPTHER

## 2019-04-04 ASSESSMENT — ENCOUNTER SYMPTOMS
RESPIRATORY SYMPTOMS COMMENTS: NO
CHEST TIGHTNESS: 1
SHORTNESS OF BREATH: 1
HEMOPTYSIS: 0
WHEEZING: 1
DYSPNEA AT REST: 1

## 2019-04-05 ENCOUNTER — OFFICE VISIT (OUTPATIENT)
Dept: PULMONOLOGY | Facility: HOSPICE | Age: 64
End: 2019-04-05
Payer: COMMERCIAL

## 2019-04-05 VITALS
WEIGHT: 250 LBS | RESPIRATION RATE: 14 BRPM | OXYGEN SATURATION: 94 % | BODY MASS INDEX: 35.79 KG/M2 | HEIGHT: 70 IN | SYSTOLIC BLOOD PRESSURE: 146 MMHG | HEART RATE: 71 BPM | DIASTOLIC BLOOD PRESSURE: 90 MMHG

## 2019-04-05 DIAGNOSIS — R06.02 SOB (SHORTNESS OF BREATH): ICD-10-CM

## 2019-04-05 DIAGNOSIS — E66.9 OBESITY (BMI 30-39.9): ICD-10-CM

## 2019-04-05 DIAGNOSIS — J98.4 RESTRICTIVE LUNG DISEASE: ICD-10-CM

## 2019-04-05 PROCEDURE — 99205 OFFICE O/P NEW HI 60 MIN: CPT | Performed by: INTERNAL MEDICINE

## 2019-04-05 NOTE — PROGRESS NOTES
CC:  Chief Complaint   Patient presents with   • New Patient   • COPD     Dyspnea on exertion referred to us for evaluation for possible COPD    HPI:   The patient is a 63 y.o. male with a history of dyspnea on exertion and history of intermittent wheezing was referred to our clinic from his primary care physician to rule out COPD.  The patient has significant smoking history however he quit few years ago.    The patient did pulmonary function tests prior to his visit which showed restrictive pattern not consistent with COPD.  Total lung capacity was 62% of predicted.    The patient has orthopnea and paroxysmal nocturnal dyspnea.  He has also lower extremity swelling.  Echocardiogram done in 2012 was unremarkable.  We do not have any chest x-ray in our system to evaluate his lung parenchyma.    ROS:   Constitutional: Denies fevers, chills, night sweats  Eyes: Denies vision loss, pain, drainage, double vision  Ears, Nose, Throat: Denies earache, difficulty hearing, tinnitus, nasal congestion, hoarseness  Cardiovascular: Denies chest pain, tightness, palpitations, orthopnea or edema  Respiratory: Please see HPI  GI: Denies heartburn, dysphagia, nausea, abdominal pain, diarrhea or constipation  : Denies frequent urination, hematuria, discharge or painful urination  Musculoskeletal: Denies back pain, painful joints, sore muscles  Neurological: Denies weakness or headaches  Skin: No rashes  All other ROS were negative except what mentioned in the HPI     Past Medical History:  Past Medical History:   Diagnosis Date   • Family history of heart attack 7/9/2014   • Former heavy cigarette smoker (20-39 per day) 7/9/2014   • Hypertension 1/18/2010   • Mixed hyperlipidemia 1/18/2018   • Obesity (BMI 30-39.9) 7/9/2014               Social History:  Social History     Social History   • Marital status: Single     Spouse name: N/A   • Number of children: N/A   • Years of education: N/A     Occupational History   • Not on file.  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 8.5 g 3   • amLODIPine (NORVASC) 10 MG Tab Take 1 Tab by mouth every day. 90 Tab 3   • lisinopril-hydrochlorothiazide (PRINZIDE, ZESTORETIC) 20-12.5 MG per tablet Take 1 Tab by mouth every day. 90 Tab 3   • fluticasone (FLOVENT HFA) 110 MCG/ACT Aerosol Inhale 2 Puffs by mouth 2 times a day. 1 Inhaler 3   • fluticasone (FLOVENT HFA) 110 MCG/ACT Aerosol Inhale 2 Puffs by mouth 2 times a day. 1 Inhaler 3   • MethylPREDNISolone (MEDROL DOSEPAK) 4 MG Tablet Therapy Pack As directed on the packaging label. 21 Tab 0   • MethylPREDNISolone (MEDROL DOSEPAK) 4 MG Tablet Therapy Pack As directed on the packaging label. (Patient not taking: Reported on 11/13/2018) 21 Tab 0     No current facility-administered medications on file prior to visit.        Allergies:   Nkda [no known drug allergy]        Vitals:    04/05/19 1044   Height: 1.778 m (5' 10\")   Weight: 113.4 kg (250 lb)   Weight % change since last entry.: 0 %   BP: 146/90   Pulse: 71   BMI (Calculated): 35.87   Resp: 14           Physical Exam:  Appearance:  in no acute distress  HEENT: Normocephalic, atraumatic, white sclera, " PERRLA, oropharynx clear  Neck: No adenopathy or masses  Respiratory: no intercostal retractions or accessory muscle use  Lungs auscultation: Clear to auscultation bilaterally  Cardiovascular: Regular rate rhythm. No murmurs, rubs or gallops.  No LE edema  Abdomen: soft, nondistended  Gait: Normal  Digits: No clubbing, cyanosis  Motor: No focal deficits  Orientation: Oriented to time, person and place      DATA:    Labs:  Lab Results   Component Value Date/Time    WBC 7.5 01/26/2018 08:59 AM    WBC 8.7 01/20/2010 04:00 PM    RBC 4.94 01/26/2018 08:59 AM    RBC 4.86 01/20/2010 04:00 PM    HEMOGLOBIN 15.0 01/26/2018 08:59 AM    HEMATOCRIT 44.9 01/26/2018 08:59 AM    MCV 90.9 01/26/2018 08:59 AM    MCV 96 01/20/2010 04:00 PM    MCH 30.4 01/26/2018 08:59 AM    MCH 32.1 01/20/2010 04:00 PM    MCHC 33.4 (L) 01/26/2018 08:59 AM    MPV 9.5 01/26/2018 08:59 AM    NEUTSPOLYS 57.40 01/26/2018 08:59 AM    LYMPHOCYTES 24.90 01/26/2018 08:59 AM    MONOCYTES 9.20 01/26/2018 08:59 AM    EOSINOPHILS 7.10 (H) 01/26/2018 08:59 AM    BASOPHILS 1.10 01/26/2018 08:59 AM      Lab Results   Component Value Date/Time    SODIUM 138 01/26/2018 08:59 AM    POTASSIUM 4.1 01/26/2018 08:59 AM    CHLORIDE 106 01/26/2018 08:59 AM    CO2 25 01/26/2018 08:59 AM    GLUCOSE 110 (H) 01/26/2018 08:59 AM    BUN 14 01/26/2018 08:59 AM    CREATININE 0.85 01/26/2018 08:59 AM    CREATININE 0.79 01/20/2010 04:00 PM    BUNCREATRAT 18 01/20/2010 04:00 PM    GLOMRATE >59 01/20/2010 04:00 PM                  Diagnosis:  1. SOB (shortness of breath)  EC-ECHOCARDIOGRAM COMPLETE W/O CONT    BTYPE NATRIURETIC PEPTIDE    CT-CHEST, HIGH RESOLUTION LUNG   2. Restrictive lung disease  CT-CHEST, HIGH RESOLUTION LUNG   3. Obesity (BMI 30-39.9)  OBESITY COUNSELING (No Charge): Patient identified as having weight management issue.  Appropriate orders and counseling given.        Assessment and Plan     Patient shortness of breath is of unclear etiology at this point.  He has  history of heavy smoking in the past but quit a few years ago however pulmonary function test is more consistent with restriction.  His lung exam was unremarkable with no inspiratory crackles to suggest pulmonary fibrosis.  The patient symptoms are somehow suggestive of cardiac disease with orthopnea and paroxysmal nocturnal dyspnea.  The patient gets lower extremity swelling at times.    I am going to check echocardiogram.  Also we will check BMP prior to next visit.  Because the patient has restriction on pulmonary function tests and decreased diffusion capacity we will do high-resolution CT scan to rule out interstitial lung disease.        Patient currently is not active smoking              Return in about 4 weeks (around 5/3/2019) for wht ct chest .        This note was created using voice recognition software. I apologize for any overlooked obvious grammar or  vocabulary mistake    Answers for HPI/ROS submitted by the patient on 4/4/2019   What is the reason for your visit today?: To make sure I'm using the right meds  Do you cough first thing in the morning or at other times during the day?: sometimes  Do you have a cough on most days? If so, how long have you had this cough?: not really  Bring up phlegm (mucus, sputum) in the morning or other times during the day?: no   Do you cough up blood from your chest?: No  Do you experience wheezing?: Yes  How often?: intermittently  Do you experience any chest tightness?: Yes  How often?: constant  Experience shortness of breath?: Yes  Experience shortness of breath at rest?: Yes  How far can you walk before becoming short of breath or need to rest? : not far  Please list what causes you to become short of breath:: putting on my shoes  Have you ever been hospitalized?: Yes  Have you ever needed to be intubated or placed on a ventilator? : No  Have you ever had problems with anesthesia?: No  Have you experienced post-operative delirium?: No  Any complications with  surgery?: No  Have you had a TB skin test? If so, please list the year and result:: 2018  Please list all your occupations from your first job to your current job. Include Industry/Company, location, year, and your specific job.: [employer]   with As[employer]: in the [employer]  Please list the places you have lived, the year, and Country or State in the [place]  Birds?: No  Dogs?: Yes  Cats?: Yes  Mice and/or Deer Mice?: No  Reptiles?: No  Coffee: 2  Carbonated soft drinks: 1    Conflicting answers have been found for some questions. Please document the patient's answers manually.

## 2019-04-08 ENCOUNTER — HOSPITAL ENCOUNTER (OUTPATIENT)
Dept: LAB | Facility: MEDICAL CENTER | Age: 64
End: 2019-04-08
Attending: INTERNAL MEDICINE
Payer: COMMERCIAL

## 2019-04-08 DIAGNOSIS — R06.02 SOB (SHORTNESS OF BREATH): ICD-10-CM

## 2019-04-08 LAB — BNP SERPL-MCNC: 19 PG/ML (ref 0–100)

## 2019-04-08 PROCEDURE — 83880 ASSAY OF NATRIURETIC PEPTIDE: CPT

## 2019-04-08 PROCEDURE — 36415 COLL VENOUS BLD VENIPUNCTURE: CPT

## 2019-04-09 ENCOUNTER — TELEPHONE (OUTPATIENT)
Dept: PULMONOLOGY | Facility: HOSPICE | Age: 64
End: 2019-04-09

## 2019-04-09 NOTE — TELEPHONE ENCOUNTER
Patient called states that he has scheduled a CT with [employer] and he has paper order for CT. Patient states that [employer] called [facility] to verify if patient is a Pulmonary patient. Informed patient that maybe Pulmonary notes with  were yet not in the system and that is why they couldn't confirm to [employer] that he is a new Pulmonary Patient.     Patient was last seen on 04/05/19 with .     Assessment and Plan      Patient shortness of breath is of unclear etiology at this point.  He has history of heavy smoking in the past but quit a few years ago however pulmonary function test is more consistent with restriction.  His lung exam was unremarkable with no inspiratory crackles to suggest pulmonary fibrosis.  The patient symptoms are somehow suggestive of cardiac disease with orthopnea and paroxysmal nocturnal dyspnea.  The patient gets lower extremity swelling at times.     I am going to check echocardiogram.  Also we will check BMP prior to next visit.  Because the patient has restriction on pulmonary function tests and decreased diffusion capacity we will do high-resolution CT scan to rule out interstitial lung disease.           Patient currently is not active smoking                    Return in about 4 weeks (around 5/3/2019) for wht ct chest .

## 2019-04-10 ENCOUNTER — TELEPHONE (OUTPATIENT)
Dept: PULMONOLOGY | Facility: HOSPICE | Age: 64
End: 2019-04-10

## 2019-04-10 NOTE — TELEPHONE ENCOUNTER
Pt called and said that he will be getting his CT at [facility] on April 16th.

## 2019-04-23 ENCOUNTER — HOSPITAL ENCOUNTER (OUTPATIENT)
Dept: CARDIOLOGY | Facility: MEDICAL CENTER | Age: 64
End: 2019-04-23
Attending: INTERNAL MEDICINE
Payer: COMMERCIAL

## 2019-04-23 DIAGNOSIS — R06.02 SOB (SHORTNESS OF BREATH): ICD-10-CM

## 2019-04-23 LAB
LV EJECT FRACT  99904: 65
LV EJECT FRACT MOD 2C 99903: 55.24
LV EJECT FRACT MOD 4C 99902: 83.95
LV EJECT FRACT MOD BP 99901: 72.2

## 2019-04-23 PROCEDURE — 93306 TTE W/DOPPLER COMPLETE: CPT

## 2019-04-23 PROCEDURE — 93306 TTE W/DOPPLER COMPLETE: CPT | Mod: 26 | Performed by: INTERNAL MEDICINE

## 2019-05-01 ENCOUNTER — OFFICE VISIT (OUTPATIENT)
Dept: PULMONOLOGY | Facility: HOSPICE | Age: 64
End: 2019-05-01
Payer: COMMERCIAL

## 2019-05-01 VITALS
RESPIRATION RATE: 16 BRPM | DIASTOLIC BLOOD PRESSURE: 86 MMHG | OXYGEN SATURATION: 94 % | WEIGHT: 250 LBS | HEIGHT: 70 IN | SYSTOLIC BLOOD PRESSURE: 150 MMHG | BODY MASS INDEX: 35.79 KG/M2 | HEART RATE: 84 BPM

## 2019-05-01 DIAGNOSIS — G47.33 OSA (OBSTRUCTIVE SLEEP APNEA): ICD-10-CM

## 2019-05-01 DIAGNOSIS — E66.9 OBESITY (BMI 30-39.9): ICD-10-CM

## 2019-05-01 PROCEDURE — 99214 OFFICE O/P EST MOD 30 MIN: CPT | Performed by: INTERNAL MEDICINE

## 2019-05-02 ENCOUNTER — OFFICE VISIT (OUTPATIENT)
Dept: MEDICAL GROUP | Facility: MEDICAL CENTER | Age: 64
End: 2019-05-02
Payer: COMMERCIAL

## 2019-05-02 VITALS
BODY MASS INDEX: 35.95 KG/M2 | WEIGHT: 251.1 LBS | OXYGEN SATURATION: 94 % | TEMPERATURE: 98.7 F | SYSTOLIC BLOOD PRESSURE: 152 MMHG | DIASTOLIC BLOOD PRESSURE: 72 MMHG | HEIGHT: 70 IN | HEART RATE: 80 BPM

## 2019-05-02 DIAGNOSIS — I10 ESSENTIAL HYPERTENSION: ICD-10-CM

## 2019-05-02 DIAGNOSIS — M72.2 PLANTAR FASCIITIS: ICD-10-CM

## 2019-05-02 DIAGNOSIS — J44.9 CHRONIC OBSTRUCTIVE PULMONARY DISEASE, UNSPECIFIED COPD TYPE (HCC): ICD-10-CM

## 2019-05-02 PROCEDURE — 99214 OFFICE O/P EST MOD 30 MIN: CPT | Performed by: FAMILY MEDICINE

## 2019-05-02 NOTE — PROGRESS NOTES
CC:  Chief Complaint   Patient presents with   • Follow-Up   • Results     ct echo and lab     Patient came today for follow-up appointment.  He has restrictive lung disease on pulmonary function test.    HPI:   The patient is a 63 y.o. male.  With history of dyspnea on exertion and wheezing and history of heavy smoking quit in 2013 came today for follow-up.    I saw the patient few weeks ago.  Pulmonary function test at that time showed moderate restrictive lung disease with total lung capacity of 62% of predicted.  The patient was having orthopnea and paroxysmal nocturnal dyspnea.  We did echocardiogram since last visit which was unremarkable.  His BNP was also normal.  High-resolution CT scan to rule out interstitial lung disease was also unremarkable.    The patient's symptoms of wheezing and shortness of breath on exertion has significantly improved since I saw him last time.  He is currently taking Flovent twice daily and albuterol on as-needed basis.      ROS:   Constitutional: Denies fevers, chills, night sweats  Eyes: Denies vision loss, pain, drainage, double vision  Ears, Nose, Throat: Denies earache, difficulty hearing, tinnitus, nasal congestion, hoarseness  Cardiovascular: Denies chest pain, tightness, palpitations, orthopnea or edema  Respiratory: Please see HPI  Sleep: The patient has heavy snoring.  Sometimes he wakes up tired.  No witnessed sleep apnea's.  GI: Denies heartburn, dysphagia, nausea, abdominal pain, diarrhea or constipation  : Denies frequent urination, hematuria, discharge or painful urination  Musculoskeletal: Denies back pain, painful joints, sore muscles  Neurological: Denies weakness or headaches  Skin: No rashes  All other ROS were negative except what mentioned in the HPI     Past Medical History:  Past Medical History:   Diagnosis Date   • Family history of heart attack 7/9/2014   • Former heavy cigarette smoker (20-39 per day) 7/9/2014   • Hypertension 1/18/2010   • Mixed  hyperlipidemia 1/18/2018   • Obesity (BMI 30-39.9) 7/9/2014               Social History:  Social History     Social History   • Marital status: Single     Spouse name: N/A   • Number of children: N/A   • Years of education: N/A     Occupational History   • Not on file.     Social History Main Topics   • Smoking status: Former Smoker     Packs/day: 1.00     Years: 42.00     Types: Cigarettes     Quit date: 3/8/2013   • Smokeless tobacco: Never Used   • Alcohol use No      Comment: quit 1 1/2 ago   • Drug use: No   • Sexual activity: Not Currently     Partners: Female      Comment: , no kids, [employer]     Other Topics Concern   • Not on file     Social History Narrative   • No narrative on file         Family History:  Family History   Problem Relation Age of Onset   • Diabetes Father    • Hypertension Father    • Heart Attack Mother    • Cancer Neg Hx    • Stroke Neg Hx        Current Outpatient Prescriptions on File Prior to Visit   Medication Sig Dispense Refill   • amLODIPine (NORVASC) 10 MG Tab Take 1 Tab by mouth every day. 90 Tab 3   • lisinopril-hydrochlorothiazide (PRINZIDE, ZESTORETIC) 20-12.5 MG per tablet Take 1 Tab by mouth every day. 90 Tab 3   • albuterol 108 (90 Base) MCG/ACT Aero Soln inhalation aerosol Inhale 2 Puffs by mouth every 6 hours as needed for Shortness of Breath. 8.5 g 3   • fluticasone (FLOVENT HFA) 110 MCG/ACT Aerosol Inhale 2 Puffs by mouth 2 times a day. 1 Inhaler 3   • fluticasone (FLOVENT HFA) 110 MCG/ACT Aerosol Inhale 2 Puffs by mouth 2 times a day. 1 Inhaler 3   • MethylPREDNISolone (MEDROL DOSEPAK) 4 MG Tablet Therapy Pack As directed on the packaging label. (Patient not taking: Reported on 5/1/2019) 21 Tab 0   • MethylPREDNISolone (MEDROL DOSEPAK) 4 MG Tablet Therapy Pack As directed on the packaging label. (Patient not taking: Reported on 11/13/2018) 21 Tab 0     No current facility-administered medications on file prior to visit.        Allergies:   Nkda [no known  "drug allergy]        Vitals:    05/01/19 1347   Height: 1.778 m (5' 10\")   Weight: 113.4 kg (250 lb)   Weight % change since last entry.: 0 %   BP: 150/86   Pulse: 84   BMI (Calculated): 35.87   Resp: 16           Physical Exam:  Appearance:  in no acute distress  HEENT: Normocephalic, atraumatic, white sclera, PERRLA, oropharynx clear  Neck: No adenopathy or masses  Respiratory: no intercostal retractions or accessory muscle use  Lungs auscultation: Clear to auscultation bilaterally  Cardiovascular: Regular rate rhythm. No murmurs, rubs or gallops.  No LE edema  Abdomen: soft, nondistended  Gait: Normal  Digits: No clubbing, cyanosis  Motor: No focal deficits  Orientation: Oriented to time, person and place      DATA:    Labs:  Lab Results   Component Value Date/Time    WBC 7.5 01/26/2018 08:59 AM    WBC 8.7 01/20/2010 04:00 PM    RBC 4.94 01/26/2018 08:59 AM    RBC 4.86 01/20/2010 04:00 PM    HEMOGLOBIN 15.0 01/26/2018 08:59 AM    HEMATOCRIT 44.9 01/26/2018 08:59 AM    MCV 90.9 01/26/2018 08:59 AM    MCV 96 01/20/2010 04:00 PM    MCH 30.4 01/26/2018 08:59 AM    MCH 32.1 01/20/2010 04:00 PM    MCHC 33.4 (L) 01/26/2018 08:59 AM    MPV 9.5 01/26/2018 08:59 AM    NEUTSPOLYS 57.40 01/26/2018 08:59 AM    LYMPHOCYTES 24.90 01/26/2018 08:59 AM    MONOCYTES 9.20 01/26/2018 08:59 AM    EOSINOPHILS 7.10 (H) 01/26/2018 08:59 AM    BASOPHILS 1.10 01/26/2018 08:59 AM      Lab Results   Component Value Date/Time    SODIUM 138 01/26/2018 08:59 AM    POTASSIUM 4.1 01/26/2018 08:59 AM    CHLORIDE 106 01/26/2018 08:59 AM    CO2 25 01/26/2018 08:59 AM    GLUCOSE 110 (H) 01/26/2018 08:59 AM    BUN 14 01/26/2018 08:59 AM    CREATININE 0.85 01/26/2018 08:59 AM    CREATININE 0.79 01/20/2010 04:00 PM    BUNCREATRAT 18 01/20/2010 04:00 PM    GLOMRATE >59 01/20/2010 04:00 PM        Imaging:  Please see HPI  ECHOCARDIOGRAM:  Please see HPI    PULMONARY FUNCTION TEST:  Please see HPI        Diagnosis:  1. Obesity (BMI 30-39.9)  Overnight " Oximetry        The patient restrictive lung disease is probably secondary to obesity and body habitus.  As listed BMI is 35.8.  Counseling on weight loss and healthy diet and active lifestyle was provided today.  Work-up for cardiac and pulmonary disease is negative so far.    The patient also did not have COPD and the pulmonary function test however his wheezing and dyspnea on exertion and exertion improved with inhaled steroids.  He did not have any more episodes of wheezing for months now.  He is asking if he can stop the Flovent since it is expensive.  We will try a trial without inhaled steroids.  The patient will resume taking them if symptoms return.    Given the patient's obesity and heavy snoring and daytime fatigue I suggested to do polysomnography however the patient does not want to do the study at this point because he is worried about the cost.  We decided to check overnight oximetry and if it is positive for hypoxemia we will order polysomnography and sleep clinic appointments.    I will call the patient that if the results of the overnight oximetry.  He will see us from now on on as-needed basis.                        Return if symptoms worsen or fail to improve.        This note was created using voice recognition software. I apologize for any overlooked obvious grammar or  vocabulary mistake

## 2019-05-03 ENCOUNTER — HOME STUDY (OUTPATIENT)
Dept: PULMONOLOGY | Facility: HOSPICE | Age: 64
End: 2019-05-03
Attending: INTERNAL MEDICINE
Payer: COMMERCIAL

## 2019-05-03 DIAGNOSIS — E66.9 OBESITY (BMI 30-39.9): ICD-10-CM

## 2019-05-03 PROCEDURE — 94762 N-INVAS EAR/PLS OXIMTRY CONT: CPT | Performed by: INTERNAL MEDICINE

## 2019-05-03 NOTE — PROGRESS NOTES
"CC: COPD, hypertension, plantar fasciitis    HPI:   Wally presents today discuss the following medical issues:    Chronic obstructive pulmonary disease, unspecified COPD type (HCC)  Patient breathing has improved a lot.  Currently he denies any cough or shortness of breath.  Has been on Flovent twice a day, and albuterol as needed.  Patient has been using the albuterol once a day especially in the morning, but he stated that all his wheeze resolved, however he feels a little bit short of breath in the morning but without any wheezing or cough..    Essential hypertension  Blood pressure has been adequately controlled.  Denies headache, chest pain, shortness of breath.  Patient has been on amlodipine 10 mg daily, and lisinopril-hydrochlorothiazide 20-12.5 mg daily.  No side effects.    Plantar fasciitis  Patient has been having pain in the bottom of his feet for 2 weeks.  However it has worsened in the past week.  Patient works [employer], walking and standing \"the whole night.  He works 10 hours 4 times a week.  The pain is more when he start walking from prolonged sitting, or when he wake up from his sleep and start walking.    Patient Active Problem List    Diagnosis Date Noted   • Hypertension 01/18/2010     Priority: High   • Restrictive lung disease 04/05/2019   • SOB (shortness of breath) 04/05/2019   • Chronic obstructive pulmonary disease (HCC) 02/28/2019   • Mixed hyperlipidemia 01/18/2018   • Obesity (BMI 30-39.9) 07/09/2014   • Former heavy cigarette smoker (20-39 per day) 07/09/2014   • Family history of heart attack 07/09/2014   • Cardiac murmur 07/09/2014       Current Outpatient Prescriptions   Medication Sig Dispense Refill   • amLODIPine (NORVASC) 10 MG Tab Take 1 Tab by mouth every day. 90 Tab 3   • lisinopril-hydrochlorothiazide (PRINZIDE, ZESTORETIC) 20-12.5 MG per tablet Take 1 Tab by mouth every day. 90 Tab 3   • albuterol 108 (90 Base) MCG/ACT Aero Soln inhalation aerosol Inhale 2 Puffs by mouth " "every 6 hours as needed for Shortness of Breath. 8.5 g 3   • fluticasone (FLOVENT HFA) 110 MCG/ACT Aerosol Inhale 2 Puffs by mouth 2 times a day. (Patient not taking: Reported on 5/2/2019) 1 Inhaler 3   • fluticasone (FLOVENT HFA) 110 MCG/ACT Aerosol Inhale 2 Puffs by mouth 2 times a day. (Patient not taking: Reported on 5/2/2019) 1 Inhaler 3   • MethylPREDNISolone (MEDROL DOSEPAK) 4 MG Tablet Therapy Pack As directed on the packaging label. (Patient not taking: Reported on 5/1/2019) 21 Tab 0   • MethylPREDNISolone (MEDROL DOSEPAK) 4 MG Tablet Therapy Pack As directed on the packaging label. (Patient not taking: Reported on 11/13/2018) 21 Tab 0     No current facility-administered medications for this visit.          Allergies as of 05/02/2019 - Reviewed 05/02/2019   Allergen Reaction Noted   • Nkda [no known drug allergy]  01/18/2010        ROS: Denies any chest pain, Shortness of breath, Changes bowel or bladder, Lower extremity edema.    Physical Exam:  /72 (BP Location: Left arm, Patient Position: Sitting, BP Cuff Size: Adult)   Pulse 80   Temp 37.1 °C (98.7 °F) (Temporal)   Ht 1.778 m (5' 10\")   Wt 113.9 kg (251 lb 1.6 oz)   SpO2 94%   BMI 36.03 kg/m²   Gen.: Well-developed, well-nourished, no apparent distress,pleasant and cooperative with the examination  Skin:  Warm and dry with good turgor. No rashes or suspicious lesions in visible areas  HEENT:Sinuses nontender with palpation, TMs clear, nares patent with pink mucosa and clear rhinorrhea,no septal deviation ,polyps or lesions. lips without lesions, oropharynx clear.  Neck: Trachea midline,no masses or adenopathy. No JVD.  Cor: Regular rate and rhythm without murmur, gallop or rub.  Lungs: Respirations unlabored.Clear to auscultation with equal breath sounds bilaterally. No wheezes, rhonchi.  Extremities: No cyanosis, clubbing or edema.  Bilateral tenderness of both heels, right more than the left.        Assessment and Plan.   63 y.o. male "     1. Chronic obstructive pulmonary disease, unspecified COPD type (HCC)  Stable.  Continue on Flovent twice a day, and albuterol as needed.    2. Essential hypertension  Adequate control.  Continue on amlodipine 10 mg daily, and lisinopril-hydrochlorothiazide 20-12.5 mg daily.    3. Plantar fasciitis  New problem.    Try short-term trial (two to three weeks) of nonsteroidal antiinflammatory drugs (NSAIDs) with food,(patient has normal kidney functions.  Recommend supportive treatment as follows:  - Performing of stretching exercises for the plantar fascia and calf muscles, which the patient can do at home  - Avoiding the use of flat shoes and barefoot walking  - Using prefabricated, over-the-counter, silicone heel shoe inserts (arch supports and/or heel cups)  - Decreasing physical activities that may be causative or aggravating (eg, excessive running, dancing, or jumping)

## 2019-05-06 NOTE — PROCEDURES
Overnight oximetry was performed on May 3, 2019.  On room air patient had a duration of 7 hours and 54 minutes.  Oxygen desaturation was marked, below 90% almost the entire time with marked clustering suggesting sleep disordered breathing.  Underlying pulmonary disorder could be contributory as well.  Average low saturation was 83%.

## 2019-05-09 ENCOUNTER — PATIENT MESSAGE (OUTPATIENT)
Dept: MEDICAL GROUP | Facility: MEDICAL CENTER | Age: 64
End: 2019-05-09

## 2019-05-10 NOTE — TELEPHONE ENCOUNTER
From: Wally Horton  To: Jaqueline iMllan M.D.  Sent: 5/9/2019 5:10 PM PDT  Subject: Prescription Question    Hello Doc here are my results for my blood pressure for the last week   5/4 5:00 pm 148 / 82 5/5 8:00am 150 /84 5/6 2:30pm 136 / 83   10:00 pm 149 /80 6:30pm 136 /78 6:30pm 143 /76    5/7 1:00am 168 /97 5/8 1:00am 140 /84 5/9 1:00am 162 /79   2:30 pm 148 /84 5:00pm 136 /78 2:30pm 130 / 70   The ones at 1:00am are when I take my pills before work

## 2019-05-14 ENCOUNTER — TELEPHONE (OUTPATIENT)
Dept: SLEEP MEDICINE | Facility: MEDICAL CENTER | Age: 64
End: 2019-05-14

## 2019-05-14 PROBLEM — G47.33 OSA (OBSTRUCTIVE SLEEP APNEA): Status: ACTIVE | Noted: 2019-05-14

## 2019-05-14 NOTE — TELEPHONE ENCOUNTER
I called the patient and informed him of his overnight pulse oximetry study results which showed severe nocturnal hypoxemia.  I recommended to the patient to have polysomnography and follow-up with the sleep clinic.  He was agreeable to that recommendation.    I ordered polysomnography and referral to the sleep clinic.

## 2019-05-19 ENCOUNTER — PATIENT MESSAGE (OUTPATIENT)
Dept: MEDICAL GROUP | Facility: MEDICAL CENTER | Age: 64
End: 2019-05-19

## 2019-05-20 NOTE — TELEPHONE ENCOUNTER
From: Wally Horton  To: Jaqueline Millan M.D.  Sent: 5/19/2019 9:56 AM PDT  Subject: Prescription Question    Hello Skyler here are my results for the last week 5/12 2:00pm 166 / 86 71, 5/13 1:00am 152 / 97 80, 1:30pm 132 / 77 77, 5/14 1:00am 160/95 76, 4:30pm 145/80 80, 5/15 1:00am 153/90 73, 1:30pm 153/86 71, 5/16 1:00am 151/83 72, 2:00pm 154/84 68, 5/17 8:30am 148/86 75, 5:30pm 128/72 75, 5/18 8:30am 147/89 77, 7:00pm 139/85 78, 5/19 7:30am 164/92 77 I am almost out of Amlodipine Have enough for a couple of days is all

## 2019-05-24 DIAGNOSIS — I10 ESSENTIAL HYPERTENSION: ICD-10-CM

## 2019-05-27 RX ORDER — AMLODIPINE BESYLATE 10 MG/1
TABLET ORAL
Qty: 90 TAB | Refills: 1 | Status: SHIPPED | OUTPATIENT
Start: 2019-05-27 | End: 2019-10-12 | Stop reason: SDUPTHER

## 2019-08-02 ENCOUNTER — OFFICE VISIT (OUTPATIENT)
Dept: MEDICAL GROUP | Facility: MEDICAL CENTER | Age: 64
End: 2019-08-02
Payer: COMMERCIAL

## 2019-08-02 VITALS
WEIGHT: 257.2 LBS | BODY MASS INDEX: 36.82 KG/M2 | HEART RATE: 82 BPM | SYSTOLIC BLOOD PRESSURE: 142 MMHG | HEIGHT: 70 IN | OXYGEN SATURATION: 91 % | DIASTOLIC BLOOD PRESSURE: 82 MMHG | TEMPERATURE: 97.9 F

## 2019-08-02 DIAGNOSIS — I10 ESSENTIAL HYPERTENSION: ICD-10-CM

## 2019-08-02 DIAGNOSIS — E78.2 MIXED HYPERLIPIDEMIA: ICD-10-CM

## 2019-08-02 DIAGNOSIS — J44.1 COPD EXACERBATION (HCC): ICD-10-CM

## 2019-08-02 PROCEDURE — 99214 OFFICE O/P EST MOD 30 MIN: CPT | Performed by: FAMILY MEDICINE

## 2019-08-02 RX ORDER — DOXYCYCLINE HYCLATE 100 MG
100 TABLET ORAL 2 TIMES DAILY
Qty: 14 TAB | Refills: 0 | Status: SHIPPED | OUTPATIENT
Start: 2019-08-02 | End: 2019-08-09

## 2019-08-02 RX ORDER — METHYLPREDNISOLONE 4 MG/1
TABLET ORAL
Qty: 21 TAB | Refills: 0 | Status: SHIPPED | OUTPATIENT
Start: 2019-08-02 | End: 2019-10-28 | Stop reason: SDUPTHER

## 2019-08-02 RX ORDER — IPRATROPIUM BROMIDE AND ALBUTEROL SULFATE 2.5; .5 MG/3ML; MG/3ML
3 SOLUTION RESPIRATORY (INHALATION) ONCE
OUTPATIENT
Start: 2019-08-02 | End: 2019-08-03

## 2019-08-02 NOTE — PROGRESS NOTES
CC: Cough/shortness of breath, hypertension, hyperlipidemia    HPI:   Wally presents today discuss the followin. COPD exacerbation (HCC)  Patient has been having productive cough with yellow phlegm for the past 3 days, has been associated with shortness of breath.  Patient has history of COPD.  He stated that he has not been taking Flovent in a regular basis.  He started taking the albuterol only now when he gets symptomatic about 3-4 times a day.  Denies any fever, chills, or chest pain.  Patient advised to take Flovent twice a day regularly.  His oxygen saturation today is normal.    2. Essential hypertension  Blood pressure has been adequately controlled.  Denies headache, chest pain, shortness of breath.  He has been on lisinopril-hydrochlorothiazide 20 mg- 12.5 mg he takes full tablet in the morning and half a tablet in the evening , and amlodipine 10 mg daily.    3. Mixed hyperlipidemia  Last lipid panel was checked in 2018, showed high total cholesterol and LDL.  10 years cardiac risk is 12.4%.  However patient has no history of diabetes, stroke or CAD.      Patient Active Problem List    Diagnosis Date Noted   • SHAAN (obstructive sleep apnea) 2019   • Restrictive lung disease 2019   • SOB (shortness of breath) 2019   • Chronic obstructive pulmonary disease (HCC) 2019   • Mixed hyperlipidemia 2018   • Obesity (BMI 30-39.9) 2014   • Former heavy cigarette smoker (20-39 per day) 2014   • Family history of heart attack 2014   • Cardiac murmur 2014   • Hypertension 2010       Current Outpatient Medications   Medication Sig Dispense Refill   • methylPREDNISolone (MEDROL DOSEPAK) 4 MG Tablet Therapy Pack As directed on the packaging label. 21 Tab 0   • doxycycline (VIBRAMYCIN) 100 MG Tab Take 1 Tab by mouth 2 times a day for 7 days. 14 Tab 0   • amLODIPine (NORVASC) 10 MG Tab Take 1 Tab by mouth every day. 90 Tab 3   •  "lisinopril-hydrochlorothiazide (PRINZIDE, ZESTORETIC) 20-12.5 MG per tablet Take 1 Tab by mouth every day. 90 Tab 3   • amLODIPine (NORVASC) 10 MG Tab TAKE 1 TABLET BY MOUTH  EVERY DAY (Patient not taking: Reported on 8/2/2019) 90 Tab 1   • albuterol 108 (90 Base) MCG/ACT Aero Soln inhalation aerosol Inhale 2 Puffs by mouth every 6 hours as needed for Shortness of Breath. (Patient not taking: Reported on 8/2/2019) 8.5 g 3   • fluticasone (FLOVENT HFA) 110 MCG/ACT Aerosol Inhale 2 Puffs by mouth 2 times a day. (Patient not taking: Reported on 5/2/2019) 1 Inhaler 3   • fluticasone (FLOVENT HFA) 110 MCG/ACT Aerosol Inhale 2 Puffs by mouth 2 times a day. (Patient not taking: Reported on 5/2/2019) 1 Inhaler 3   • MethylPREDNISolone (MEDROL DOSEPAK) 4 MG Tablet Therapy Pack As directed on the packaging label. (Patient not taking: Reported on 11/13/2018) 21 Tab 0     No current facility-administered medications for this visit.          Allergies as of 08/02/2019 - Reviewed 05/02/2019   Allergen Reaction Noted   • Nkda [no known drug allergy]  01/18/2010        ROS: Denies any chest pain, Shortness of breath, Changes bowel or bladder, Lower extremity edema.    Physical Exam:  /82   Pulse 82   Temp 36.6 °C (97.9 °F) (Temporal)   Ht 1.778 m (5' 10\")   Wt 116.7 kg (257 lb 3.2 oz)   SpO2 91%   BMI 36.90 kg/m²   Gen.: Well-developed, well-nourished, no apparent distress,pleasant and cooperative with the examination  Skin:  Warm and dry with good turgor. No rashes or suspicious lesions in visible areas  HEENT:Sinuses nontender with palpation, TMs clear, nares patent with pink mucosa and clear rhinorrhea,no septal deviation ,polyps or lesions. lips without lesions, oropharynx clear.  Neck: Trachea midline,no masses or adenopathy. No JVD.  Cor: Regular rate and rhythm without murmur, gallop or rub.  Lungs: Respirations unlabored.Decreased breath sounds bilaterally.  Diffuse wheezes(has improved after treatment), no " crackles  Extremities: No cyanosis, clubbing or edema.        Assessment and Plan.   64 y.o. male     1. COPD exacerbation (HCC)  Patient has not been taking Flovent in a regular basis.  He started taking the albuterol only now when he gets symptomatic.  We will give the patient DuoNeb treatment in the clinic today.  Patient advised to take Flovent twice a day regularly.  Will start oral antibiotics and steroids.  Recommend supportive treatment as follows:    - ipratropium-albuterol (DUONEB) nebulizer solution  - methylPREDNISolone (MEDROL DOSEPAK) 4 MG Tablet Therapy Pack; As directed on the packaging label.  Dispense: 21 Tab; Refill: 0  - doxycycline (VIBRAMYCIN) 100 MG Tab; Take 1 Tab by mouth 2 times a day for 7 days.  Dispense: 14 Tab; Refill: 0    2. Essential hypertension  Adequately controlled.    Continue on lisinopril-hydrochlorothiazide 20 mg- 12.5 mg daily, and amlodipine 10 mg daily.    3. Mixed hyperlipidemia  Last lipid panel was checked in January 2018, showed high total cholesterol and LDL.  10 years cardiac risk is 12.4%.  Will repeat lipid panel.  Will discuss options of treatment next visit.  Patient is counseled about lifestyle modification.    - LIPID PANEL

## 2019-09-20 ENCOUNTER — HOSPITAL ENCOUNTER (OUTPATIENT)
Dept: LAB | Facility: MEDICAL CENTER | Age: 64
End: 2019-09-20
Attending: FAMILY MEDICINE
Payer: COMMERCIAL

## 2019-09-20 DIAGNOSIS — I10 ESSENTIAL HYPERTENSION: ICD-10-CM

## 2019-09-20 LAB
ALBUMIN SERPL BCP-MCNC: 4.5 G/DL (ref 3.2–4.9)
ALBUMIN/GLOB SERPL: 1.5 G/DL
ALP SERPL-CCNC: 50 U/L (ref 30–99)
ALT SERPL-CCNC: 19 U/L (ref 2–50)
ANION GAP SERPL CALC-SCNC: 7 MMOL/L (ref 0–11.9)
AST SERPL-CCNC: 21 U/L (ref 12–45)
BASOPHILS # BLD AUTO: 1.3 % (ref 0–1.8)
BASOPHILS # BLD: 0.11 K/UL (ref 0–0.12)
BILIRUB SERPL-MCNC: 0.5 MG/DL (ref 0.1–1.5)
BUN SERPL-MCNC: 17 MG/DL (ref 8–22)
CALCIUM SERPL-MCNC: 9.3 MG/DL (ref 8.5–10.5)
CHLORIDE SERPL-SCNC: 104 MMOL/L (ref 96–112)
CHOLEST SERPL-MCNC: 215 MG/DL (ref 100–199)
CO2 SERPL-SCNC: 26 MMOL/L (ref 20–33)
CREAT SERPL-MCNC: 0.92 MG/DL (ref 0.5–1.4)
EOSINOPHIL # BLD AUTO: 0.6 K/UL (ref 0–0.51)
EOSINOPHIL NFR BLD: 6.9 % (ref 0–6.9)
ERYTHROCYTE [DISTWIDTH] IN BLOOD BY AUTOMATED COUNT: 48.3 FL (ref 35.9–50)
FASTING STATUS PATIENT QL REPORTED: NORMAL
GLOBULIN SER CALC-MCNC: 3 G/DL (ref 1.9–3.5)
GLUCOSE SERPL-MCNC: 98 MG/DL (ref 65–99)
HCT VFR BLD AUTO: 46.8 % (ref 42–52)
HDLC SERPL-MCNC: 54 MG/DL
HGB BLD-MCNC: 14.7 G/DL (ref 14–18)
IMM GRANULOCYTES # BLD AUTO: 0.02 K/UL (ref 0–0.11)
IMM GRANULOCYTES NFR BLD AUTO: 0.2 % (ref 0–0.9)
LDLC SERPL CALC-MCNC: 133 MG/DL
LYMPHOCYTES # BLD AUTO: 2.24 K/UL (ref 1–4.8)
LYMPHOCYTES NFR BLD: 25.8 % (ref 22–41)
MCH RBC QN AUTO: 29.8 PG (ref 27–33)
MCHC RBC AUTO-ENTMCNC: 31.4 G/DL (ref 33.7–35.3)
MCV RBC AUTO: 94.7 FL (ref 81.4–97.8)
MONOCYTES # BLD AUTO: 0.79 K/UL (ref 0–0.85)
MONOCYTES NFR BLD AUTO: 9.1 % (ref 0–13.4)
NEUTROPHILS # BLD AUTO: 4.92 K/UL (ref 1.82–7.42)
NEUTROPHILS NFR BLD: 56.7 % (ref 44–72)
NRBC # BLD AUTO: 0 K/UL
NRBC BLD-RTO: 0 /100 WBC
PLATELET # BLD AUTO: 360 K/UL (ref 164–446)
PMV BLD AUTO: 9.8 FL (ref 9–12.9)
POTASSIUM SERPL-SCNC: 4.6 MMOL/L (ref 3.6–5.5)
PROT SERPL-MCNC: 7.5 G/DL (ref 6–8.2)
RBC # BLD AUTO: 4.94 M/UL (ref 4.7–6.1)
SODIUM SERPL-SCNC: 137 MMOL/L (ref 135–145)
TRIGL SERPL-MCNC: 139 MG/DL (ref 0–149)
TSH SERPL DL<=0.005 MIU/L-ACNC: 0.55 UIU/ML (ref 0.38–5.33)
WBC # BLD AUTO: 8.7 K/UL (ref 4.8–10.8)

## 2019-09-20 PROCEDURE — 84443 ASSAY THYROID STIM HORMONE: CPT

## 2019-09-20 PROCEDURE — 36415 COLL VENOUS BLD VENIPUNCTURE: CPT

## 2019-09-20 PROCEDURE — 85025 COMPLETE CBC W/AUTO DIFF WBC: CPT

## 2019-09-20 PROCEDURE — 80061 LIPID PANEL: CPT

## 2019-09-20 PROCEDURE — 80053 COMPREHEN METABOLIC PANEL: CPT

## 2019-10-12 DIAGNOSIS — I10 ESSENTIAL HYPERTENSION: ICD-10-CM

## 2019-10-14 RX ORDER — AMLODIPINE BESYLATE 10 MG/1
TABLET ORAL
Qty: 90 TAB | Refills: 3 | Status: SHIPPED | OUTPATIENT
Start: 2019-10-14

## 2019-10-14 RX ORDER — AMLODIPINE BESYLATE 10 MG/1
10 TABLET ORAL DAILY
Qty: 90 TAB | Refills: 3 | Status: SHIPPED | OUTPATIENT
Start: 2019-10-14

## 2019-10-28 DIAGNOSIS — J44.1 COPD EXACERBATION (HCC): ICD-10-CM

## 2019-10-28 RX ORDER — METHYLPREDNISOLONE 4 MG/1
TABLET ORAL
Qty: 21 TAB | Refills: 0 | Status: SHIPPED | OUTPATIENT
Start: 2019-10-28 | End: 2020-10-02 | Stop reason: SDUPTHER

## 2019-10-31 ENCOUNTER — OFFICE VISIT (OUTPATIENT)
Dept: MEDICAL GROUP | Facility: MEDICAL CENTER | Age: 64
End: 2019-10-31
Payer: COMMERCIAL

## 2019-10-31 VITALS
DIASTOLIC BLOOD PRESSURE: 80 MMHG | TEMPERATURE: 98.3 F | HEIGHT: 70 IN | BODY MASS INDEX: 37.65 KG/M2 | HEART RATE: 76 BPM | SYSTOLIC BLOOD PRESSURE: 134 MMHG | RESPIRATION RATE: 16 BRPM | OXYGEN SATURATION: 93 % | WEIGHT: 263 LBS

## 2019-10-31 DIAGNOSIS — G47.33 OSA (OBSTRUCTIVE SLEEP APNEA): ICD-10-CM

## 2019-10-31 DIAGNOSIS — J44.9 CHRONIC OBSTRUCTIVE PULMONARY DISEASE, UNSPECIFIED COPD TYPE (HCC): ICD-10-CM

## 2019-10-31 DIAGNOSIS — E78.2 MIXED HYPERLIPIDEMIA: ICD-10-CM

## 2019-10-31 DIAGNOSIS — I10 ESSENTIAL HYPERTENSION: ICD-10-CM

## 2019-10-31 PROCEDURE — 99214 OFFICE O/P EST MOD 30 MIN: CPT | Performed by: FAMILY MEDICINE

## 2019-10-31 RX ORDER — METHYLPREDNISOLONE 4 MG/1
TABLET ORAL
Qty: 21 TAB | Refills: 0 | Status: SHIPPED | OUTPATIENT
Start: 2019-10-31

## 2019-10-31 ASSESSMENT — PATIENT HEALTH QUESTIONNAIRE - PHQ9: CLINICAL INTERPRETATION OF PHQ2 SCORE: 0

## 2019-11-29 DIAGNOSIS — I10 ESSENTIAL HYPERTENSION: ICD-10-CM

## 2019-12-02 RX ORDER — LISINOPRIL AND HYDROCHLOROTHIAZIDE 20; 12.5 MG/1; MG/1
1 TABLET ORAL DAILY
Qty: 90 TAB | Refills: 3 | Status: SHIPPED | OUTPATIENT
Start: 2019-12-02 | End: 2020-07-25 | Stop reason: SDUPTHER

## 2020-04-27 DIAGNOSIS — J44.1 COPD EXACERBATION (HCC): ICD-10-CM

## 2020-04-27 DIAGNOSIS — R06.02 SOB (SHORTNESS OF BREATH): ICD-10-CM

## 2020-04-27 RX ORDER — ALBUTEROL SULFATE 90 UG/1
2 AEROSOL, METERED RESPIRATORY (INHALATION) EVERY 6 HOURS PRN
Qty: 8.5 G | Refills: 3 | Status: SHIPPED | OUTPATIENT
Start: 2020-04-27 | End: 2020-07-13 | Stop reason: SDUPTHER

## 2020-04-27 RX ORDER — FLUTICASONE PROPIONATE 110 UG/1
2 AEROSOL, METERED RESPIRATORY (INHALATION) 2 TIMES DAILY
Qty: 1 INHALER | Refills: 3 | Status: SHIPPED | OUTPATIENT
Start: 2020-04-27

## 2020-05-15 ENCOUNTER — PATIENT MESSAGE (OUTPATIENT)
Dept: MEDICAL GROUP | Facility: MEDICAL CENTER | Age: 65
End: 2020-05-15

## 2020-05-18 ENCOUNTER — PATIENT MESSAGE (OUTPATIENT)
Dept: MEDICAL GROUP | Facility: MEDICAL CENTER | Age: 65
End: 2020-05-18

## 2020-05-18 NOTE — TELEPHONE ENCOUNTER
From: Wally Horton  To: Jaqueline Millan M.D.  Sent: 5/18/2020 10:57 AM PDT  Subject: Procedure Question    I am unemployed and have no Ins how much money do I need to see you      ----- Message -----   From:Jaqueline Millan M.D.   Sent:5/18/2020 9:16 AM PDT   To:Wally Horton   Subject:RE: Procedure Question    I need to see you to examine you and discuss option of treatment.      ----- Message -----   From:Wally Horton   Sent:5/15/2020 12:52 PM PDT   To:Jaqueline Millan M.D.   Subject:Procedure Question    Hello I just tried to take a shower I ran out of breathe had a panic attack My COPD is getting real bad can't do much without running out of breathe cant even walk 20 feet was wondering if I could get a stronger inhaler I could only afford the one Albutarol sulfate Thank You Wally Horton

## 2020-07-13 DIAGNOSIS — R06.02 SOB (SHORTNESS OF BREATH): ICD-10-CM

## 2020-07-13 RX ORDER — ALBUTEROL SULFATE 90 UG/1
2 AEROSOL, METERED RESPIRATORY (INHALATION) EVERY 6 HOURS PRN
Qty: 8.5 G | Refills: 3 | Status: SHIPPED | OUTPATIENT
Start: 2020-07-13 | End: 2020-09-24 | Stop reason: SDUPTHER

## 2020-07-25 DIAGNOSIS — I10 ESSENTIAL HYPERTENSION: ICD-10-CM

## 2020-07-27 RX ORDER — LISINOPRIL AND HYDROCHLOROTHIAZIDE 20; 12.5 MG/1; MG/1
1 TABLET ORAL DAILY
Qty: 90 TAB | Refills: 3 | Status: SHIPPED | OUTPATIENT
Start: 2020-07-27

## 2020-09-24 DIAGNOSIS — R06.02 SOB (SHORTNESS OF BREATH): ICD-10-CM

## 2020-09-25 RX ORDER — ALBUTEROL SULFATE 90 UG/1
2 AEROSOL, METERED RESPIRATORY (INHALATION) EVERY 6 HOURS PRN
Qty: 8.5 G | Refills: 3 | Status: SHIPPED | OUTPATIENT
Start: 2020-09-25

## 2020-10-02 DIAGNOSIS — J44.1 COPD EXACERBATION (HCC): ICD-10-CM

## 2020-10-05 RX ORDER — METHYLPREDNISOLONE 4 MG/1
TABLET ORAL
Qty: 21 TAB | Refills: 0 | Status: SHIPPED | OUTPATIENT
Start: 2020-10-05 | End: 2020-12-08

## 2020-12-07 DIAGNOSIS — J44.1 COPD EXACERBATION (HCC): ICD-10-CM

## 2020-12-08 ENCOUNTER — HOSPITAL ENCOUNTER (OUTPATIENT)
Facility: MEDICAL CENTER | Age: 65
End: 2020-12-08
Attending: PHYSICIAN ASSISTANT
Payer: MEDICAID

## 2020-12-08 PROCEDURE — U0003 INFECTIOUS AGENT DETECTION BY NUCLEIC ACID (DNA OR RNA); SEVERE ACUTE RESPIRATORY SYNDROME CORONAVIRUS 2 (SARS-COV-2) (CORONAVIRUS DISEASE [COVID-19]), AMPLIFIED PROBE TECHNIQUE, MAKING USE OF HIGH THROUGHPUT TECHNOLOGIES AS DESCRIBED BY CMS-2020-01-R: HCPCS

## 2020-12-08 RX ORDER — METHYLPREDNISOLONE 4 MG/1
TABLET ORAL
Qty: 21 TAB | Refills: 0 | Status: SHIPPED | OUTPATIENT
Start: 2020-12-08

## 2020-12-09 LAB
COVID ORDER STATUS COVID19: NORMAL
SARS-COV-2 RNA RESP QL NAA+PROBE: NOTDETECTED
SPECIMEN SOURCE: NORMAL

## 2020-12-11 ENCOUNTER — HOSPITAL ENCOUNTER (OUTPATIENT)
Dept: RADIOLOGY | Facility: MEDICAL CENTER | Age: 65
End: 2020-12-11
Attending: PHYSICIAN ASSISTANT
Payer: MEDICAID

## 2020-12-11 DIAGNOSIS — J44.9 CHRONIC OBSTRUCTIVE PULMONARY DISEASE, UNSPECIFIED COPD TYPE (HCC): ICD-10-CM

## 2020-12-11 PROCEDURE — 71046 X-RAY EXAM CHEST 2 VIEWS: CPT

## 2021-02-12 ENCOUNTER — TELEPHONE (OUTPATIENT)
Dept: MEDICAL GROUP | Facility: MEDICAL CENTER | Age: 66
End: 2021-02-12

## 2021-02-12 NOTE — TELEPHONE ENCOUNTER
Received request via: Patient    Was the patient seen in the last year in this department? Yes    Does the patient have an active prescription (recently filled or refills available) for medication(s) requested? No     Patient is requesting a refill of Omega-3 Acid Ethyl Esters 1 g to be taken by mouth 4 times daily that is not currently on his medication list.

## 2021-02-27 ENCOUNTER — PATIENT OUTREACH (OUTPATIENT)
Dept: HEALTH INFORMATION MANAGEMENT | Facility: OTHER | Age: 66
End: 2021-02-27

## 2021-03-01 ENCOUNTER — TELEPHONE (OUTPATIENT)
Dept: MEDICAL GROUP | Facility: MEDICAL CENTER | Age: 66
End: 2021-03-01

## 2021-03-01 DIAGNOSIS — E78.2 MIXED HYPERLIPIDEMIA: ICD-10-CM

## 2021-08-09 DIAGNOSIS — Z00.6 RESEARCH STUDY PATIENT: ICD-10-CM

## 2021-08-10 ENCOUNTER — HOSPITAL ENCOUNTER (OUTPATIENT)
Facility: MEDICAL CENTER | Age: 66
End: 2021-08-10
Attending: PATHOLOGY
Payer: COMMERCIAL

## 2021-08-11 DIAGNOSIS — Z00.6 RESEARCH STUDY PATIENT: ICD-10-CM

## 2021-08-19 LAB
ELF SCORE: 8.1
RELATIVE RISK: NORMAL
RISK GROUP: NORMAL
RISK: 3.3 %

## 2022-12-12 ENCOUNTER — HOSPITAL ENCOUNTER (OUTPATIENT)
Dept: LAB | Facility: MEDICAL CENTER | Age: 67
End: 2022-12-12
Attending: NURSE PRACTITIONER
Payer: MEDICARE

## 2022-12-12 LAB
ALBUMIN SERPL BCP-MCNC: 4.3 G/DL (ref 3.2–4.9)
ALBUMIN/GLOB SERPL: 1.4 G/DL
ALP SERPL-CCNC: 67 U/L (ref 30–99)
ALT SERPL-CCNC: 20 U/L (ref 2–50)
ANION GAP SERPL CALC-SCNC: 11 MMOL/L (ref 7–16)
AST SERPL-CCNC: 16 U/L (ref 12–45)
BASOPHILS # BLD AUTO: 1 % (ref 0–1.8)
BASOPHILS # BLD: 0.08 K/UL (ref 0–0.12)
BILIRUB SERPL-MCNC: 0.6 MG/DL (ref 0.1–1.5)
BUN SERPL-MCNC: 16 MG/DL (ref 8–22)
CALCIUM SERPL-MCNC: 9.4 MG/DL (ref 8.5–10.5)
CHLORIDE SERPL-SCNC: 102 MMOL/L (ref 96–112)
CHOLEST SERPL-MCNC: 212 MG/DL (ref 100–199)
CO2 SERPL-SCNC: 27 MMOL/L (ref 20–33)
CREAT SERPL-MCNC: 0.78 MG/DL (ref 0.5–1.4)
EOSINOPHIL # BLD AUTO: 0.32 K/UL (ref 0–0.51)
EOSINOPHIL NFR BLD: 4 % (ref 0–6.9)
ERYTHROCYTE [DISTWIDTH] IN BLOOD BY AUTOMATED COUNT: 50.4 FL (ref 35.9–50)
FASTING STATUS PATIENT QL REPORTED: NORMAL
GFR SERPLBLD CREATININE-BSD FMLA CKD-EPI: 99 ML/MIN/1.73 M 2
GLOBULIN SER CALC-MCNC: 3.1 G/DL (ref 1.9–3.5)
GLUCOSE SERPL-MCNC: 101 MG/DL (ref 65–99)
HCT VFR BLD AUTO: 50.3 % (ref 42–52)
HCV AB SER QL: NORMAL
HDLC SERPL-MCNC: 59 MG/DL
HGB BLD-MCNC: 16.2 G/DL (ref 14–18)
HIV 1+2 AB+HIV1 P24 AG SERPL QL IA: NORMAL
IMM GRANULOCYTES # BLD AUTO: 0.04 K/UL (ref 0–0.11)
IMM GRANULOCYTES NFR BLD AUTO: 0.5 % (ref 0–0.9)
LDLC SERPL CALC-MCNC: 141 MG/DL
LYMPHOCYTES # BLD AUTO: 1.81 K/UL (ref 1–4.8)
LYMPHOCYTES NFR BLD: 22.8 % (ref 22–41)
MCH RBC QN AUTO: 30.3 PG (ref 27–33)
MCHC RBC AUTO-ENTMCNC: 32.2 G/DL (ref 33.7–35.3)
MCV RBC AUTO: 94 FL (ref 81.4–97.8)
MONOCYTES # BLD AUTO: 0.74 K/UL (ref 0–0.85)
MONOCYTES NFR BLD AUTO: 9.3 % (ref 0–13.4)
NEUTROPHILS # BLD AUTO: 4.96 K/UL (ref 1.82–7.42)
NEUTROPHILS NFR BLD: 62.4 % (ref 44–72)
NRBC # BLD AUTO: 0 K/UL
NRBC BLD-RTO: 0 /100 WBC
PLATELET # BLD AUTO: 429 K/UL (ref 164–446)
PMV BLD AUTO: 9.5 FL (ref 9–12.9)
POTASSIUM SERPL-SCNC: 4.3 MMOL/L (ref 3.6–5.5)
PROT SERPL-MCNC: 7.4 G/DL (ref 6–8.2)
RBC # BLD AUTO: 5.35 M/UL (ref 4.7–6.1)
SODIUM SERPL-SCNC: 140 MMOL/L (ref 135–145)
TRIGL SERPL-MCNC: 60 MG/DL (ref 0–149)
TSH SERPL DL<=0.005 MIU/L-ACNC: 0.96 UIU/ML (ref 0.38–5.33)
WBC # BLD AUTO: 8 K/UL (ref 4.8–10.8)

## 2022-12-12 PROCEDURE — 85025 COMPLETE CBC W/AUTO DIFF WBC: CPT

## 2022-12-12 PROCEDURE — 87389 HIV-1 AG W/HIV-1&-2 AB AG IA: CPT

## 2022-12-12 PROCEDURE — 36415 COLL VENOUS BLD VENIPUNCTURE: CPT

## 2022-12-12 PROCEDURE — 86803 HEPATITIS C AB TEST: CPT

## 2022-12-12 PROCEDURE — 84443 ASSAY THYROID STIM HORMONE: CPT

## 2022-12-12 PROCEDURE — 80061 LIPID PANEL: CPT

## 2022-12-12 PROCEDURE — 80053 COMPREHEN METABOLIC PANEL: CPT

## 2024-04-15 ENCOUNTER — HOSPITAL ENCOUNTER (OUTPATIENT)
Dept: LAB | Facility: MEDICAL CENTER | Age: 69
End: 2024-04-15
Payer: MEDICARE

## 2024-04-15 LAB
ALBUMIN SERPL BCP-MCNC: 4.2 G/DL (ref 3.2–4.9)
ALBUMIN/GLOB SERPL: 1.3 G/DL
ALP SERPL-CCNC: 72 U/L (ref 30–99)
ALT SERPL-CCNC: 26 U/L (ref 2–50)
ANION GAP SERPL CALC-SCNC: 16 MMOL/L (ref 7–16)
AST SERPL-CCNC: 23 U/L (ref 12–45)
BILIRUB SERPL-MCNC: 0.7 MG/DL (ref 0.1–1.5)
BUN SERPL-MCNC: 17 MG/DL (ref 8–22)
CALCIUM ALBUM COR SERPL-MCNC: 9 MG/DL (ref 8.5–10.5)
CALCIUM SERPL-MCNC: 9.2 MG/DL (ref 8.5–10.5)
CHLORIDE SERPL-SCNC: 100 MMOL/L (ref 96–112)
CHOLEST SERPL-MCNC: 163 MG/DL (ref 100–199)
CO2 SERPL-SCNC: 23 MMOL/L (ref 20–33)
CREAT SERPL-MCNC: 0.83 MG/DL (ref 0.5–1.4)
ERYTHROCYTE [DISTWIDTH] IN BLOOD BY AUTOMATED COUNT: 46 FL (ref 35.9–50)
GFR SERPLBLD CREATININE-BSD FMLA CKD-EPI: 96 ML/MIN/1.73 M 2
GLOBULIN SER CALC-MCNC: 3.2 G/DL (ref 1.9–3.5)
GLUCOSE SERPL-MCNC: 105 MG/DL (ref 65–99)
HCT VFR BLD AUTO: 54.9 % (ref 42–52)
HDLC SERPL-MCNC: 50 MG/DL
HGB BLD-MCNC: 18.3 G/DL (ref 14–18)
LDLC SERPL CALC-MCNC: 98 MG/DL
MCH RBC QN AUTO: 30.6 PG (ref 27–33)
MCHC RBC AUTO-ENTMCNC: 33.3 G/DL (ref 32.3–36.5)
MCV RBC AUTO: 91.7 FL (ref 81.4–97.8)
PLATELET # BLD AUTO: 321 K/UL (ref 164–446)
PMV BLD AUTO: 10 FL (ref 9–12.9)
POTASSIUM SERPL-SCNC: 4.1 MMOL/L (ref 3.6–5.5)
PROT SERPL-MCNC: 7.4 G/DL (ref 6–8.2)
RBC # BLD AUTO: 5.99 M/UL (ref 4.7–6.1)
SODIUM SERPL-SCNC: 139 MMOL/L (ref 135–145)
TRIGL SERPL-MCNC: 74 MG/DL (ref 0–149)
WBC # BLD AUTO: 7.9 K/UL (ref 4.8–10.8)

## 2024-04-15 PROCEDURE — 80053 COMPREHEN METABOLIC PANEL: CPT

## 2024-04-15 PROCEDURE — 85027 COMPLETE CBC AUTOMATED: CPT

## 2024-04-15 PROCEDURE — 36415 COLL VENOUS BLD VENIPUNCTURE: CPT

## 2024-04-15 PROCEDURE — 80061 LIPID PANEL: CPT

## 2025-03-04 ENCOUNTER — HOSPITAL ENCOUNTER (OUTPATIENT)
Dept: LAB | Facility: MEDICAL CENTER | Age: 70
End: 2025-03-04
Attending: NURSE PRACTITIONER
Payer: MEDICARE

## 2025-03-04 LAB
APPEARANCE UR: CLEAR
BILIRUB UR QL STRIP.AUTO: NEGATIVE
COLOR UR: YELLOW
ERYTHROCYTE [DISTWIDTH] IN BLOOD BY AUTOMATED COUNT: 45.6 FL (ref 35.9–50)
EST. AVERAGE GLUCOSE BLD GHB EST-MCNC: 131 MG/DL
GLUCOSE UR STRIP.AUTO-MCNC: NEGATIVE MG/DL
HBA1C MFR BLD: 6.2 % (ref 4–5.6)
HCT VFR BLD AUTO: 50.8 % (ref 42–52)
HGB BLD-MCNC: 16.8 G/DL (ref 14–18)
KETONES UR STRIP.AUTO-MCNC: NEGATIVE MG/DL
LEUKOCYTE ESTERASE UR QL STRIP.AUTO: NEGATIVE
MCH RBC QN AUTO: 31.1 PG (ref 27–33)
MCHC RBC AUTO-ENTMCNC: 33.1 G/DL (ref 32.3–36.5)
MCV RBC AUTO: 93.9 FL (ref 81.4–97.8)
MICRO URNS: NORMAL
NITRITE UR QL STRIP.AUTO: NEGATIVE
PH UR STRIP.AUTO: 6.5 [PH] (ref 5–8)
PLATELET # BLD AUTO: 341 K/UL (ref 164–446)
PMV BLD AUTO: 9.8 FL (ref 9–12.9)
PROT UR QL STRIP: NEGATIVE MG/DL
RBC # BLD AUTO: 5.41 M/UL (ref 4.7–6.1)
RBC UR QL AUTO: NEGATIVE
SP GR UR STRIP.AUTO: 1.02
UROBILINOGEN UR STRIP.AUTO-MCNC: 1 EU/DL
WBC # BLD AUTO: 7.7 K/UL (ref 4.8–10.8)

## 2025-03-04 PROCEDURE — 80061 LIPID PANEL: CPT

## 2025-03-04 PROCEDURE — 36415 COLL VENOUS BLD VENIPUNCTURE: CPT

## 2025-03-04 PROCEDURE — 82306 VITAMIN D 25 HYDROXY: CPT | Mod: GA

## 2025-03-04 PROCEDURE — 85027 COMPLETE CBC AUTOMATED: CPT

## 2025-03-04 PROCEDURE — 84153 ASSAY OF PSA TOTAL: CPT | Mod: GA

## 2025-03-04 PROCEDURE — 86803 HEPATITIS C AB TEST: CPT

## 2025-03-04 PROCEDURE — 81003 URINALYSIS AUTO W/O SCOPE: CPT

## 2025-03-04 PROCEDURE — 83036 HEMOGLOBIN GLYCOSYLATED A1C: CPT | Mod: GA

## 2025-03-04 PROCEDURE — 80053 COMPREHEN METABOLIC PANEL: CPT

## 2025-03-05 LAB
25(OH)D3 SERPL-MCNC: 45 NG/ML (ref 30–100)
ALBUMIN SERPL BCP-MCNC: 4.3 G/DL (ref 3.2–4.9)
ALBUMIN/GLOB SERPL: 1.3 G/DL
ALP SERPL-CCNC: 66 U/L (ref 30–99)
ALT SERPL-CCNC: 26 U/L (ref 2–50)
ANION GAP SERPL CALC-SCNC: 11 MMOL/L (ref 7–16)
AST SERPL-CCNC: 27 U/L (ref 12–45)
BILIRUB SERPL-MCNC: 0.6 MG/DL (ref 0.1–1.5)
BUN SERPL-MCNC: 15 MG/DL (ref 8–22)
CALCIUM ALBUM COR SERPL-MCNC: 9.4 MG/DL (ref 8.5–10.5)
CALCIUM SERPL-MCNC: 9.6 MG/DL (ref 8.5–10.5)
CHLORIDE SERPL-SCNC: 100 MMOL/L (ref 96–112)
CHOLEST SERPL-MCNC: 162 MG/DL (ref 100–199)
CO2 SERPL-SCNC: 28 MMOL/L (ref 20–33)
CREAT SERPL-MCNC: 0.9 MG/DL (ref 0.5–1.4)
GFR SERPLBLD CREATININE-BSD FMLA CKD-EPI: 93 ML/MIN/1.73 M 2
GLOBULIN SER CALC-MCNC: 3.3 G/DL (ref 1.9–3.5)
GLUCOSE SERPL-MCNC: 95 MG/DL (ref 65–99)
HCV AB SER QL: NORMAL
HDLC SERPL-MCNC: 52 MG/DL
LDLC SERPL CALC-MCNC: 88 MG/DL
POTASSIUM SERPL-SCNC: 3.7 MMOL/L (ref 3.6–5.5)
PROT SERPL-MCNC: 7.6 G/DL (ref 6–8.2)
SODIUM SERPL-SCNC: 139 MMOL/L (ref 135–145)
TRIGL SERPL-MCNC: 111 MG/DL (ref 0–149)

## 2025-03-07 LAB
PSA FREE MFR SERPL: ABNORMAL %
PSA FREE SERPL-MCNC: ABNORMAL NG/ML
PSA SERPL-MCNC: 35.8 NG/ML (ref 0–4)

## 2025-04-08 ENCOUNTER — HOSPITAL ENCOUNTER (OUTPATIENT)
Dept: LAB | Facility: MEDICAL CENTER | Age: 70
End: 2025-04-08
Attending: UROLOGY
Payer: MEDICARE

## 2025-04-08 PROCEDURE — 84520 ASSAY OF UREA NITROGEN: CPT

## 2025-04-08 PROCEDURE — 84153 ASSAY OF PSA TOTAL: CPT

## 2025-04-08 PROCEDURE — 36415 COLL VENOUS BLD VENIPUNCTURE: CPT

## 2025-04-08 PROCEDURE — 82565 ASSAY OF CREATININE: CPT

## 2025-04-09 LAB
BUN SERPL-MCNC: 16 MG/DL (ref 8–22)
CREAT SERPL-MCNC: 0.89 MG/DL (ref 0.5–1.4)
GFR SERPLBLD CREATININE-BSD FMLA CKD-EPI: 93 ML/MIN/1.73 M 2
PSA SERPL DL<=0.01 NG/ML-MCNC: 33.7 NG/ML (ref 0–4)

## 2025-05-13 ENCOUNTER — APPOINTMENT (OUTPATIENT)
Dept: RADIOLOGY | Facility: MEDICAL CENTER | Age: 70
End: 2025-05-13
Attending: UROLOGY
Payer: MEDICARE

## 2025-05-13 DIAGNOSIS — R97.20 ELEVATED PROSTATE SPECIFIC ANTIGEN (PSA): ICD-10-CM

## 2025-05-13 PROCEDURE — 700111 HCHG RX REV CODE 636 W/ 250 OVERRIDE (IP): Mod: JZ,TB | Performed by: RADIOLOGY

## 2025-05-13 RX ADMIN — GLUCAGON 1 MG: 1 INJECTION, POWDER, LYOPHILIZED, FOR SOLUTION INTRAMUSCULAR; INTRAVENOUS at 08:19

## 2025-07-09 ENCOUNTER — HOSPITAL ENCOUNTER (OUTPATIENT)
Dept: LAB | Facility: MEDICAL CENTER | Age: 70
End: 2025-07-09
Payer: MEDICARE

## 2025-07-09 LAB
ALBUMIN SERPL BCP-MCNC: 4.2 G/DL (ref 3.2–4.9)
ALBUMIN/GLOB SERPL: 1.2 G/DL
ALP SERPL-CCNC: 75 U/L (ref 30–99)
ALT SERPL-CCNC: 30 U/L (ref 2–50)
ANION GAP SERPL CALC-SCNC: 14 MMOL/L (ref 7–16)
AST SERPL-CCNC: 24 U/L (ref 12–45)
BILIRUB SERPL-MCNC: 0.6 MG/DL (ref 0.1–1.5)
BUN SERPL-MCNC: 16 MG/DL (ref 8–22)
CALCIUM ALBUM COR SERPL-MCNC: 9.5 MG/DL (ref 8.5–10.5)
CALCIUM SERPL-MCNC: 9.7 MG/DL (ref 8.5–10.5)
CHLORIDE SERPL-SCNC: 99 MMOL/L (ref 96–112)
CO2 SERPL-SCNC: 26 MMOL/L (ref 20–33)
CREAT SERPL-MCNC: 0.75 MG/DL (ref 0.5–1.4)
GFR SERPLBLD CREATININE-BSD FMLA CKD-EPI: 98 ML/MIN/1.73 M 2
GLOBULIN SER CALC-MCNC: 3.4 G/DL (ref 1.9–3.5)
GLUCOSE SERPL-MCNC: 79 MG/DL (ref 65–99)
POTASSIUM SERPL-SCNC: 3.8 MMOL/L (ref 3.6–5.5)
PROT SERPL-MCNC: 7.6 G/DL (ref 6–8.2)
PSA SERPL DL<=0.01 NG/ML-MCNC: 3.55 NG/ML (ref 0–4)
SODIUM SERPL-SCNC: 139 MMOL/L (ref 135–145)
TESTOST SERPL-MCNC: <3 NG/DL (ref 175–781)

## 2025-07-09 PROCEDURE — 80053 COMPREHEN METABOLIC PANEL: CPT

## 2025-07-09 PROCEDURE — 84403 ASSAY OF TOTAL TESTOSTERONE: CPT

## 2025-07-09 PROCEDURE — 84153 ASSAY OF PSA TOTAL: CPT

## 2025-07-09 PROCEDURE — 36415 COLL VENOUS BLD VENIPUNCTURE: CPT
